# Patient Record
Sex: FEMALE
[De-identification: names, ages, dates, MRNs, and addresses within clinical notes are randomized per-mention and may not be internally consistent; named-entity substitution may affect disease eponyms.]

---

## 2019-01-24 LAB
HCT VFR BLD CALC: 40.4 % (ref 36–45)
LYMPHOCYTES # SPEC AUTO: 2.1 K/UL (ref 0.7–4.9)
PMV BLD: 10 FL (ref 7.6–11.3)
RBC # BLD: 4.45 M/UL (ref 3.86–4.86)

## 2019-01-24 NOTE — RAD REPORT
EXAM DESCRIPTION:  RAD - Chest Pa And Lat (2 Views) - 1/24/2019 11:00 am

 

CLINICAL HISTORY:  preop

Chest pain.

 

COMPARISON:  No comparisons

 

FINDINGS:  The lungs are clear. The heart is mildly enlarged in size. No displaced fractures. Cervica
l hardware plate is present.

## 2019-01-24 NOTE — EKG
Test Date:    2019-01-24               Test Time:    11:09:15

Technician:   FREIDA                                    

                                                     

MEASUREMENT RESULTS:                                       

Intervals:                                           

Rate:         60                                     

CO:           140                                    

QRSD:         84                                     

QT:           412                                    

QTc:          412                                    

Axis:                                                

P:            40                                     

CO:           140                                    

QRS:          31                                     

T:            20                                     

                                                     

INTERPRETIVE STATEMENTS:                                       

                                                     

Normal sinus rhythm

Normal ECG

No previous ECG available for comparison



Electronically Signed On 01-24-19 16:48:25 CST by Marcus Mack

## 2019-01-30 LAB
BUN BLD-MCNC: 13 MG/DL (ref 7–18)
GLUCOSE SERPLBLD-MCNC: 108 MG/DL (ref 74–106)
POTASSIUM SERPL-SCNC: 4.1 MMOL/L (ref 3.5–5.1)

## 2019-01-31 ENCOUNTER — HOSPITAL ENCOUNTER (OUTPATIENT)
Dept: HOSPITAL 97 - OR | Age: 69
Discharge: HOME | End: 2019-01-31
Attending: PODIATRIST
Payer: COMMERCIAL

## 2019-01-31 DIAGNOSIS — F17.210: ICD-10-CM

## 2019-01-31 DIAGNOSIS — M72.2: Primary | ICD-10-CM

## 2019-01-31 PROCEDURE — 71046 X-RAY EXAM CHEST 2 VIEWS: CPT

## 2019-01-31 PROCEDURE — 28008 INCISION OF FOOT FASCIA: CPT

## 2019-01-31 PROCEDURE — 80048 BASIC METABOLIC PNL TOTAL CA: CPT

## 2019-01-31 PROCEDURE — 0JNR0ZZ RELEASE LEFT FOOT SUBCUTANEOUS TISSUE AND FASCIA, OPEN APPROACH: ICD-10-PCS

## 2019-01-31 PROCEDURE — 36415 COLL VENOUS BLD VENIPUNCTURE: CPT

## 2019-01-31 PROCEDURE — 93005 ELECTROCARDIOGRAM TRACING: CPT

## 2019-01-31 PROCEDURE — 85025 COMPLETE CBC W/AUTO DIFF WBC: CPT

## 2019-01-31 NOTE — OP
Date of Procedure:  01/31/2019



Surgeon:  Bautista Pete DPM



Preoperative Diagnosis:  Plantar fasciitis, left foot.



Postoperative Diagnosis:  Plantar fasciitis, left foot.



Procedure:  Plantar fasciotomy, left foot.



Anesthesia:  Via local infiltration.



Procedure In Detail:  The patient was brought to the operating room, placed on the operating room tab
le in supine position.  Once adequate IV sedation was obtained, the patient was injected with a total
 of 10 cc of 0.5% Marcaine plain.  The patient was prepped and draped in the usual sterile manner.  T
he left extremity was elevated at 60 degrees and Esmarch bandage was applied to exsanguinate the bloo
d supply.  Pneumatic ankle tourniquet was elevated to 250 mmHg.  The Esmarch was removed.  Procedure 
attention was then directed to the plantar medial aspect of the left heel where a 2 cm plantar medial
 incision was made from the medial aspect of the plantar fascial border extending laterally 2 cm.  Th
e incision followed skin lines.  Utilizing sharp and blunt dissection, the plantar fascia was isolate
d.  All neurologic structures were avoided.  All bleeders were clamped and bovied.  The plantar fasci
a was visualized utilizing a 15 blade, an incision was made to free the plantar fascia from the left 
medial aspect to approximately 1/3 of the plantar fascia.  This was approximately 1 cm in length.  Th
e superior muscle belly was visualized without damage.  The area was flushed with copious amounts of 
sterile saline.  The skin was closed utilizing 3-0 nylon horizontal mattress suture.  The area was fl
ushed with copious amounts of sterile saline before closure.  The area was dressed with Adaptic, dry 
sterile gauze, dry sterile Kerlix, cold therapy pad and Ace bandage.  Pneumatic ankle tourniquet was 
deflated and capillary return was seen to be instantaneous to all 

digits.  The patient will be seen in my office for postoperative care.  The patient was sent to [facility] in satisfactory condition.





PRESLEY/ELBA

DD:  01/31/2019 08:20:37Voice ID:  [id number]

DT:  01/31/2019 19:00:09Report ID:  [id number]

## 2019-01-31 NOTE — PREOPHP
Date of Admission:  2019



PODIATRIC HISTORY AND PHYSICAL



History Of Present Illness:  This patient presented to my office with a chief complaint of painful le
ft heel, present with walking, throbbing in nature, relieved by rest.  The patient has been treated f
or plantar fasciitis in this foot with injection therapy and steroid, orthotics, changing shoe gear, 
stretching, ice, night splint, all with unsatisfactory relief of pain.  The patient requests surgical
 management.  The patient has been explained the alternatives of shockwave therapy, but declines.  Th
e patient has had a recent bout of shingles, which has resolved.



Current Medical History:  Includes as mentioned above, shingles and a history of cancer.



Past Surgical History:  Includes , gallbladder, shoulder repair, hysterectomy, __________, a
nd neck plate.



Current Medications:  Include omeprazole 40 mg, magnesium __________ and turmeric 400 mg.



Allergies:  THERE ARE NO TRUE ALLERGIES.  HOWEVER, THE PATIENT HAS SENSITIVITY TO CODEINE, HYDROCODON
E, AND TRAMADOL.



Social History:  The patient admits to smoking every day.  No alcohol or IV drug use.



Family History:  Includes cancer and reflux in her father.



Physical Examination:

General:  The patient is healthy, well developed, well nourished, well oriented x3. 

Vascular:  Evaluation reveals dorsalis pedis and posterior tibial pulses 4/4 bilaterally.  Capillary 
refill time is less than 3 seconds.  Temperature gradient is within normal limits.  There are no vari
cosities or history of DVT. 

Musculoskeletal:  The patient has a flexible cavus foot type bilaterally with increased varus of the 
rearfoot and supinatus of the forefoot with adductus bilaterally.  There is a dorsal medial eminence 
of the first metatarsal head with range of motion to 90 degrees bilaterally.  Equinus is noted to be 
0 degrees bilaterally per goniometer measurement.  Digital exam is within normal limits.  Muscle test
ing, knee assessment and ankle assessment are within normal limits equal and symmetrical bilaterally.
  There is tenderness on palpation of the plantar fascia on the left, but not right.  There are no paul
bcutaneous soft tissue growths noted on the feet bilaterally.  Skin evaluation reveals no rash, ulcer
, tumor or contracture.  There is some discrete callus noted on the plantar aspect of __________ MPJ 
in the left foot. 

Neurologic:  Evaluation reveals deep tendon reflexes of the patella and Achilles to be 5/5 bilaterall
y.  Vibratory and sharp dull sensation within normal limits. 



X-ray evaluation reveals an inferior calcaneal spur present with no fracture, tumor or DJD seen.  Les
ser metatarsals are adducted 1 to 4.



Diagnosis:  Plantar fasciitis, left foot, with calcaneal spur, left foot.



Plan:  Recommended treatment is for plantar fasciotomy of the left foot.  The patient understands ris
ks, benefits, and alternatives of the above-mentioned procedure including, but not limited to the ris
k of pain, swelling, numbness, stiffness, nonhealing of skin and recurrence of the problem.  Risks of
 DVT and PE have been explained to the patient.  Due to lack of success of conservative treatment, no
 response to conservative treatment, the patient requests surgical management.  Cold therapy unit has
 been explained to the patient.  The patient will be instructed to take two 500 mg tablets of Tylenol
 and 3 ibuprofen every 6 hours as needed for pain and may lower it to Tylenol and 2 ibuprofen with he
r omeprazole.  The patient may also take her Lyrica that she still has for additional pain relief, bu
t not more than twice daily on the Lyrica.  The patient is scheduled for surgery at [facility] on 2019.  Medical H and P will be completed by Anesthesia.  Preoperative labs
 have been ordered.





KISHORE

DD:  2019 10:12:04Voice ID:  [id number]

## 2019-01-31 NOTE — DS
Date of Discharge:  01/31/2019



Date Of Surgery:  01/31/2019.



Surgeon:  Bautista Pete DPM.



Preoperative Diagnosis:  Plantar fasciitis, left foot.



Postoperative Diagnosis:  Plantar fasciitis, left foot.



Procedure:  Plantar fasciotomy left foot.



Anesthesia:  The patient tolerated the anesthesia and procedure well. 



The patient was transferred to recovery room in satisfactory condition and may be sent home per [facility] guidelines. The patient may ambulate in postop shoes.  The patient has been give postop written
 instructions in written form as well as emergency phone numbers and instructions on how to use cold 
therapy unit and take her Lyrica and ibuprofen as tolerated due to her multiple allergies and narcoti
cs.





PRESLEY/ELBA

DD:  01/31/2019 08:20:37Voice ID:  [id number]

DT:  01/31/2019 19:07:37Report ID:  [id number]

## 2023-02-17 LAB
BUN BLD-MCNC: 13 MG/DL (ref 7–18)
GLUCOSE SERPLBLD-MCNC: 100 MG/DL (ref 74–106)
HCT VFR BLD CALC: 38.7 % (ref 36–45)
INR BLD: 1
LYMPHOCYTES # SPEC AUTO: 2 K/UL (ref 0.7–4.9)
MCV RBC: 89.8 FL (ref 80–100)
PMV BLD: 8.9 FL (ref 7.6–11.3)
POTASSIUM SERPL-SCNC: 4.2 MMOL/L (ref 3.5–5.1)
RBC # BLD: 4.3 M/UL (ref 3.86–4.86)

## 2023-02-17 NOTE — EKG
Test Date:    2023-02-17               Test Time:    10:10:25

Technician:   LUCÍA                                     

                                                     

MEASUREMENT RESULTS:                                       

Intervals:                                           

Rate:         61                                     

CT:           146                                    

QRSD:         80                                     

QT:           390                                    

QTc:          392                                    

Axis:                                                

P:            65                                     

CT:           146                                    

QRS:          64                                     

T:            63                                     

                                                     

INTERPRETIVE STATEMENTS:                                       

                                                     

Normal sinus rhythm

Normal ECG

Compared to ECG 01/24/2019 11:09:15

No significant changes



Electronically Signed On 02-17-23 15:59:46 CST by Ari Man

## 2023-02-17 NOTE — RAD REPORT
EXAM DESCRIPTION:  RAD - Chest Pa And Lat (2 Views) - 2/17/2023 10:31 am

 

CLINICAL HISTORY:  pre op for surgery

 

COMPARISON:  Chest Pa And Lat (2 Views) dated 1/24/2019

 

FINDINGS:  Lines: None.

Lungs: No evidence of edema or pneumonia.

Pleural: No significant pleural effusions or pneumothorax.

Cardiac: The heart size is within normal limits.

Mediastinum: Within normal limits.

Bones: No acute fractures. ACDF in the cervical spine.

Other: None

 

IMPRESSION:  No acute cardiopulmonary disease.

## 2023-02-22 ENCOUNTER — HOSPITAL ENCOUNTER (OUTPATIENT)
Dept: HOSPITAL 97 - OR | Age: 73
Setting detail: OBSERVATION
LOS: 1 days | Discharge: HOME HEALTH | End: 2023-02-23
Attending: ORTHOPAEDIC SURGERY | Admitting: ORTHOPAEDIC SURGERY
Payer: COMMERCIAL

## 2023-02-22 VITALS — BODY MASS INDEX: 32.5 KG/M2

## 2023-02-22 VITALS — OXYGEN SATURATION: 91 %

## 2023-02-22 DIAGNOSIS — Z20.822: ICD-10-CM

## 2023-02-22 DIAGNOSIS — M17.11: Primary | ICD-10-CM

## 2023-02-22 DIAGNOSIS — Z88.6: ICD-10-CM

## 2023-02-22 DIAGNOSIS — I10: ICD-10-CM

## 2023-02-22 LAB — HCT VFR BLD CALC: 34.3 % (ref 36–45)

## 2023-02-22 PROCEDURE — 85730 THROMBOPLASTIN TIME PARTIAL: CPT

## 2023-02-22 PROCEDURE — 71046 X-RAY EXAM CHEST 2 VIEWS: CPT

## 2023-02-22 PROCEDURE — 36415 COLL VENOUS BLD VENIPUNCTURE: CPT

## 2023-02-22 PROCEDURE — 85610 PROTHROMBIN TIME: CPT

## 2023-02-22 PROCEDURE — 73560 X-RAY EXAM OF KNEE 1 OR 2: CPT

## 2023-02-22 PROCEDURE — 88304 TISSUE EXAM BY PATHOLOGIST: CPT

## 2023-02-22 PROCEDURE — 97116 GAIT TRAINING THERAPY: CPT

## 2023-02-22 PROCEDURE — 85018 HEMOGLOBIN: CPT

## 2023-02-22 PROCEDURE — 97139 UNLISTED THERAPEUTIC PX: CPT

## 2023-02-22 PROCEDURE — 93005 ELECTROCARDIOGRAM TRACING: CPT

## 2023-02-22 PROCEDURE — 97161 PT EVAL LOW COMPLEX 20 MIN: CPT

## 2023-02-22 PROCEDURE — 27447 TOTAL KNEE ARTHROPLASTY: CPT

## 2023-02-22 PROCEDURE — 85025 COMPLETE CBC W/AUTO DIFF WBC: CPT

## 2023-02-22 PROCEDURE — 94010 BREATHING CAPACITY TEST: CPT

## 2023-02-22 PROCEDURE — 88311 DECALCIFY TISSUE: CPT

## 2023-02-22 PROCEDURE — 87811 SARS-COV-2 COVID19 W/OPTIC: CPT

## 2023-02-22 PROCEDURE — 97110 THERAPEUTIC EXERCISES: CPT

## 2023-02-22 PROCEDURE — 97530 THERAPEUTIC ACTIVITIES: CPT

## 2023-02-22 PROCEDURE — 85014 HEMATOCRIT: CPT

## 2023-02-22 PROCEDURE — 0SRC069 REPLACEMENT OF RIGHT KNEE JOINT WITH OXIDIZED ZIRCONIUM ON POLYETHYLENE SYNTHETIC SUBSTITUTE, CEMENTED, OPEN APPROACH: ICD-10-PCS

## 2023-02-22 PROCEDURE — 80048 BASIC METABOLIC PNL TOTAL CA: CPT

## 2023-02-22 RX ADMIN — HYDROCODONE BITARTRATE AND ACETAMINOPHEN PRN TAB: 7.5; 325 TABLET ORAL at 16:40

## 2023-02-22 RX ADMIN — SODIUM CHLORIDE SCH MLS: 9 INJECTION, SOLUTION INTRAVENOUS at 16:47

## 2023-02-22 RX ADMIN — ONDANSETRON PRN MG: 2 INJECTION INTRAMUSCULAR; INTRAVENOUS at 17:58

## 2023-02-22 RX ADMIN — HYDROCODONE BITARTRATE AND ACETAMINOPHEN PRN TAB: 7.5; 325 TABLET ORAL at 21:02

## 2023-02-22 NOTE — P.BOP
Preoperative diagnosis: right knee osteoarthritis


Postoperative diagnosis: same


Primary procedure: right total knee arthroplasty


Assistant: NONE,NONE


Estimated blood loss: 40 cc


Specimen: right knee bone remnants


Findings: see dictation


Anesthesia: General


Complications: None


Implants: Bi[employer] Jules Persona 5 CR femur, D tibia, 10 CR poly, 26 patella


Fluids & blood products: per anesthesia record; TT: 64 mins @ 300 mmHg


Transferred to: Recovery Room


Condition: Good

## 2023-02-22 NOTE — RAD REPORT
EXAM DESCRIPTION:  RAD - Knee Right 2 View - 2/22/2023 3:49 pm

 

CLINICAL HISTORY:  Postop

 

COMPARISON:  None.

 

FINDINGS:  Two views of the right knee.

 

A right total knee arthroplasty has been performed. Hardware is in satisfactory alignment, without ev
idence of immediate complications. Soft tissue gas and Skin staples are noted. No suspicious osseous 
lesions.

 

IMPRESSION:  Postoperative right knee following right total-knee arthroplasty with no unexpected find
ing.

## 2023-02-22 NOTE — XMS REPORT
Continuity of Care Document

                          Created on:2023



Patient:NIGEL LEZAMA

Sex:Female

:1950

External Reference #:[id number]





Demographics







                          Address                   [address] [place] [address]

 

                          Home Phone                [phone]

 

                          Work Phone                [phone]

 

                          Mobile Phone              [phone]

 

                          Email Address             [email or web address]

 

                          Preferred Language        English

 

                          Marital Status            Unknown

 

                          Episcopalian Affiliation     Unknown

 

                          Race                      Unknown

 

                          Additional Race(s)        Unavailable

 

                          Ethnic Group              Unknown









Author







                          Organization              [facility]

t

 

                          Address                   [address]



                                                    [place] [address]

 

                          Phone                     [phone]









Support







                Name            Relationship    Address         Phone

 

                LORENZA LEZAMAU            [address] [phone]



                                                [place] [address] 

 

                LORENZA LEZAMA SPOU            [address] [phone]



                                                [place] [address] 

 

                Nigel Lezama Unavailable     [phone]



                                                [place] [address] 

 

                Lorenza Lezama Unavailable     [phone]



                                                [place] [address] 

 

                YEAGER III, АЛЕКСАНДР Unavailable     Unavailable     [phone]









Care Team Providers







                    Name                Role                Phone

 

                    Candido Llanes     Attending Clinician Unavailable

 

                    Luis Alberto Sinclair       Attending Clinician Unavailable

 

                    Luis Alberto Sinclair       Admitting Clinician Unavailable









Payers







           Payer Name Policy Type Policy Number Effective Date Expiration Date S

shikha

 

           [employer]ET[employer]      53         [id number] 2022            Common Spiri

t



                                            00:00:00              - [facility]

 

           [employer]ETNA      53         [id number]                       Common Spiri

t



                                                                  - [facility]

 

           AETNA MEDICARE 53         [id number]                         Common Spi

rit



                                                                  - [facility]







Problems







       Condition Condition Condition Status Onset  Resolution Last   Treating Co

mments 

Source



       Name   Details Category        Date   Date   Treatment Clinician        



                                                 Date                 

 

       [id number] Basal cell Problem                                           Co

mmon



              carcinoma                                                  [facility]



              of skin of                                                  - [facility]



              nose                                                    [facility]

 

       [id number] Arthritis Problem                                           Co

mmon



       [id number] of right                                                  [facility]



              knee                                                    - [facility]



                                                                      [facility]

 

       [id number] Essential Problem                                           Comm

on



              (primary)                                                  Spirit



              hypertensi                                                  - [facility]



              on                                                      [facility]

 

       Gastro-eso Gastro-eso Problem                                           C

ommon



       phageal phageal                                                  Spirit



       reflux reflux                                                  - [facility]



       disease disease                                                  [facility]



       without without                                                  [facility]



       esophagiti esophagiti                                                  Me

dical



       s      s                                                       Center

 

       Osteopenia Osteopenia Problem                                           C

ommon



                                                                      Spirit



                                                                      - [facility]



                                                                      [facility]

 

       Arthralgia Hip pain, Problem                                           Co

mmon



       of the right                                                   [facility]



       pelvic                                                         - [facility]



       region and                                                         [facility]

 

       [id number] Acute  Problem                                           Common



              vaginitis                                                  [facility]

 

       Migraine Migraine Problem                                           Commo

n



       aura   headache                                                  Spirit



       without without                                                  - [facility]



       headache aura                                                    [facility]

 

       [id number] Hypercalce Problem                                           Com

mon



              christiano                                                     Spirit



                                                                      [facility]

 

       Osteoarthr Osteoarthr Problem                                           C

ommon



       itis of itis of                                                  [facility]



       knee   knee,                                                   - [facility]



              unspecifie                                                  [facility]



              laterality                                                  Medica

l



              ,                                                       Center



              unspecifie                                                  



              d                                                       



              osteoarthr                                                  



              itis type                                                  

 

       [id number] Post   Problem                                           Common



              herpetic                                                  Spirit



              neuralgia                                                  - [facility]



                                                                      [facility]

 

       [id number] Body mass Problem                                           Com

mon



              index                                                   Spirit



              (BMI) of                                                  - [facility]



              32.0-32.9                                                  St



              in [facility]

 

       [id number] Other  Problem                                           Common



              obesity                                                  Spirit



              due to                                                  - [facility]



              excess                                                  [facility]

 

       [id number] Mixed  Problem                                           Common



              hyperlipid                                                  Spirit



              emia                                                    [facility]

 

       [id number] Primary Problem                                           Comm

on



       73781  osteoarthr                                                  Spirit



              itis of                                                  - [facility]



              left knee                                                  [facility]







Allergies, Adverse Reactions, Alerts







       Allergy Allergy Status Severity Reaction(s) Onset  Inactive Treating Comm

ents 

Source



       Name   Type                        Date   Date   Clinician        

 

       VICODIN DA     Active U             2008                      [facility]



                                          0-14                        Clear



                                          00:00:                      [facility]



                                                                    [facility]

 

       CODEINE DA     Active U             2008                      [facility]



                                          0-14                        Clear



                                          00:00:                      [facility]



                                                                    [facility]

 

       MORPHINE DA     Active U             2008                      [facility]



                                          0-14                        Clear



                                          00:00:                      [facility]



                                                                    [facility]

 

       No Known DA     Active U             2008                      [facility]



       Contrast                             0-14                        Clear



       Allergie                             00:00:                      Johnson



       s                                                            [facility]

 

       No Known DA     Active U             2008                      [facility]



       Food                               0-14                        Clear



       Allergie                             00:00:                      Johnson



       s                                                            [facility]

 

       No Known DA     Active U             2008                      [facility]



       Other                              0-14                        Clear



       Allergie                             00:00:                      Johnson



       s                                                            [facility]

 

       codeine DA     Active U             -                      [facility]



                                          2-03                        Clear



                                          00:00:                      [facility]



                                                                    [facility]

 

       Codeine Codeine Active        Unknown                             Common



                                                                      [facility]







Social History







           Social Habit Start Date Stop Date  Quantity   Comments   Source

 

           History of Tobacco Use                                             Co

mmon [facility]

 

           Sex Assigned At Birth                                             Com

mon [facility]









                Smoking Status  Start Date      Stop Date       Source

 

                Never Smoker                                    Common [facility]







Medications







       Ordered Filled Start  Stop   Current Ordering Indication Dosage Frequency

 Signature

                    Comments            Components          Source



     Medication Medication Date Date Medication? Clinician                (SIG) 

          



     Name Name                                                   

 

     Lin Vancemaciej 2022      No             1{appli BID  Triamcinol 

          



     ne   ne   2-27                     cation}      one            



     Acetonide Acetonide 00:00:                               Acetonide         

  



     0.1 % 0.1 % 00                                 0.1 %           

 

     Triamcinolo Triamcinolo 2022      No             1{appli BID  Triamcinol 

          



     ne   ne   2-27                     cation}      one            



     Acetonide Acetonide 00:00:                               Acetonide         

  



     0.1 % 0.1 % 00                                 0.1 %           

 

     Triamcinolo Triamcinolo 2022      No             1{appli BID  Triamcinol 

          



     ne   ne   2-27                     cation}      one            



     Acetonide Acetonide 00:00:                               Acetonide         

  



     0.1 % 0.1 % 00                                 0.1 %           

 

     Triamcinolo Triamcinolo 2022      No             1{appli BID  Triamcinol 

          



     ne   ne   2-27                     cation}      one            



     Acetonide Acetonide 00:00:                               Acetonide         

  



     0.1 % 0.1 % 00                                 0.1 %           

 

     Triamcinolo Triamcinolo 2022      No             1{appli BID  Triamcinol 

          



     ne   ne   2-27                     cation}      one            



     Acetonide Acetonide 00:00:                               Acetonide         

  



     0.1 % 0.1 % 00                                 0.1 %           

 

     Triamcinolo Triamcinolo 2022      No             1{appli BID  Triamcinol 

          



     ne   ne   2-27                     cation}      one            



     Acetonide Acetonide 00:00:                               Acetonide         

  



     0.1 % 0.1 % 00                                 0.1 %           

 

     Ketorolac Ketorolac -0      No             30mg                     Com

mon



     15mg 15mg 7-15                                              Spirit



               00:00:                                              -                                                 [facility]

 

     Ketorolac Ketorolac -0      No             30mg                     Com

mon



     15mg 15mg 7-15                                              Spirit



               00:00:                                              -                                                 [facility]

 

     Ketorolac Ketorolac -0      No             30mg                     Com

mon



     15mg 15mg 7-15                                              Spirit



               00:00:                                                                                              [facility]

 

     Ketorolac Ketorolac -0      No             30mg                     Com

mon



     15mg 15mg 7-15                                              Spirit



               00:00:                                              - [facility]



                                                               [facility]

 

     Ketorolac Ketorolac 2-0      No             30mg                     Com

mon



     15mg 15mg 7-15                                              Spirit



               00:00:                                              - [facility]



                                                               [facility]

 

     Ketorolac Ketorolac 2-0      No             30mg                     Com

mon



     15mg 15mg 7-15                                              Spirit



               00:00:                                              - [facility]



                                                               [facility]

 

     Ketorolac Ketorolac 2-0      No             30mg                     Com

mon



     15mg 15mg 7-15                                              Spirit



               00:00:                                              - [facility]



                                                               [facility]

 

     Ketorolac Ketorolac 2-0      No             30mg                     Com

mon



     15mg 15mg 7-15                                              Spirit



               00:00:                                              - [facility]



                                                               [facility]

 

     Ketorolac Ketorolac 2-0      No             30mg                     Com

mon



     15mg 15mg 7-15                                              Spirit



               00:00:                                              - [facility]I



                                                               [facility]

 

     Ketorolac Ketorolac 2-0      No             30mg                     Com

mon



     15mg 15mg 7-15                                              Spirit



               00:00:                                              - [facility]



                                                               [facility]

 

     Ketorolac Ketorolac 2-0      No             30mg                     Com

mon



     15mg 15mg 7-15                                              Spirit



               00:00:                                              - [facility]



                                                               [facility]

 

     Pseudoeph-B Pseudoeph-B 2022- No             10{ml_a TID  Pseudoeph-

           



     romphen-DM romphen-DM                 s_neede      Bromphen-D    

       



     20 00:00: 00:00                d}        M 30-1-20           



     MG/5ML MG/5ML 00   :00                           MG/5ML           

 

     Pseudoeph-B Pseudoeph-B 2022- No             10{ml_a TID  Pseudoeph-

           



     romphen-DM romphen-DM                 s_neede      Bromphen-D    

       



     20 00:00: 00:00                d}        M 30-1-20           



     MG/5ML MG/5ML 00   :00                           MG/5ML           

 

     Pseudoeph-B Pseudoeph-B 2022- No             10{ml_a TID  Pseudoeph-

           



     romphen-DM romphen-DM                 s_neede      Bromphen-D    

       



     20 00:00: 00:00                d}        M 30-1-20           



     MG/5ML MG/5ML 00   :00                           MG/5ML           

 

     Pseudoeph-B Pseudoeph-B -0 2- No             10{ml_a TID  Pseudoeph-

           



     romphen-DM romphen-DM  05-26                s_neede      Bromphen-D    

       



     30-20 - 00:00: 00:00                d}        M 30-1-20           



     MG/5ML MG/5ML 00   :00                           MG/5ML           

 

     Bupivicaine Bupivicaine -0      No             2.5mg                   

  Common



     Hydro Hydro 1-13                                              Spirit



               00:00:                                              - [facility]



               00                                                [facility]

 

     Bupivicaine Bupivicaine -0      No             2.5mg                   

  Common



     Hydro Hydro 1-13                                              Spirit



               00:00:                                              - [facility]



               00                                                [facility]

 

     Kenalog Kenalog -0      No             40mg                     Common



     (Triamcinol (Triamcinol 1-13                                              S

pirit



     one) one) 00:00:                                              - [facility]I



               00                                                [facility]

 

     Kenalog Kenalog -0      No             40mg                     Common



     (Triamcinol (Triamcinol 1-13                                              S

pirit



     one) one) 00:00:                                              - [facility]I



               00                                                [facility]

 

     Bupivicaine Bupivicaine 2-0      No             2.5mg                   

  Common



     Hydro Hydro 1-13                                              Spirit



               00:00:                                              - [facility]I



               00                                                [facility]

 

     Bupivicaine Bupivicaine 2-0      No             2.5mg                   

  Common



     Hydro Hydro 1-13                                              Spirit



               00:00:                                              - [facility]



               00                                                [facility]

 

     Kenalog Kenalog -0      No             40mg                     Common



     (Triamcinol (Triamcinol 1-13                                              S

pirit



     one) one) 00:00:                                              - [facility]



               00                                                [facility]

 

     Kenalog Kenalog -0      No             40mg                     Common



     (Triamcinol (Triamcinol 1-13                                              S

pirit



     one) one) 00:00:                                              - [facility]I



               00                                                [facility]

 

     Bupivicaine Bupivicaine 2-0      No             2.5mg                   

  Common



     Hydro Hydro 1-13                                              Spirit



               00:00:                                              - [facility]



               00                                                [facility]

 

     Bupivicaine Bupivicaine 2-0      No             2.5mg                   

  Common



     Hydro Hydro 1-13                                              Spirit



               00:00:                                              - [facility]



               00                                                [facility]

 

     Kenalog Kenalog -0      No             40mg                     Common



     (Triamcinol (Triamcinol 1-13                                              S

pirit



     one) one) 00:00:                                              - [facility]



               00                                                [facility]

 

     Kenalog Kenalog -0      No             40mg                     Common



     (Triamcinol (Triamcinol 1-13                                              S

pirit



     one) one) 00:00:                                              - [facility]



               00                                                [facility]

 

     Bupivicaine Bupivicaine -0      No             2.5mg                   

  Common



     Hydro Hydro 1-13                                              Spirit



               00:00:                                              - [facility]



               00                                                [facility]

 

     Bupivicaine Bupivicaine -0      No             2.5mg                   

  Common



     Hydro Hydro 1-13                                              Spirit



               00:00:                                              - [facility]



               00                                                [facility]

 

     Kenalog Kenalog -0      No             40mg                     Common



     (Triamcinol (Triamcinol 1-13                                              S

pirit



     one) one) 00:00:                                              - [facility]



               00                                                [facility]

 

     Kenalog Kenalog -0      No             40mg                     Common



     (Triamcinol (Triamcinol 1-13                                              S

pirit



     one) one) 00:00:                                              - [facility]



               00                                                [facility]

 

     Bupivicaine Bupivicaine -0      No             2.5mg                   

  Common



     Hydro Hydro 1-13                                              Spirit



               00:00:                                              - [facility]



               00                                                [facility]

 

     Bupivicaine Bupivicaine -0      No             2.5mg                   

  Common



     Hydro Hydro 1-13                                              Spirit



               00:00:                                              - [facility]



               00                                                [facility]

 

     Kenalog Kenalog -0      No             40mg                     Common



     (Triamcinol (Triamcinol 1-13                                              S

pirit



     one) one) 00:00:                                              - [facility]



               00                                                [facility]

 

     Kenalog Kenalog -0      No             40mg                     Common



     (Triamcinol (Triamcinol 1-13                                              S

pirit



     one) one) 00:00:                                              - [facility]



               00                                                [facility]

 

     Bupivicaine Bupivicaine 2-0      No             2.5mg                   

  Common



     Hydro Hydro 1-13                                              Spirit



               00:00:                                              - [facility]



               00                                                [facility]

 

     Bupivicaine Bupivicaine 2-0      No             2.5mg                   

  Common



     Hydro Hydro 1-13                                              Spirit



               00:00:                                              - [facility]



               00                                                [facility]

 

     Kenalog Kenalog 0      No             40mg                     Common



     (Triamcinol (Triamcinol 1-13                                              S

pirit



     one) one) 00:00:                                              - [facility]



               00                                                [facility]

 

     Kenalog Kenalog -0      No             40mg                     Common



     (Triamcinol (Triamcinol 1-13                                              S

pirit



     one) one) 00:00:                                              - [facility]



               00                                                [facility]

 

     Bupivicaine Bupivicaine -0      No             2.5mg                   

  Common



     Hydro Hydro 1-13                                              Spirit



               00:00:                                              - [facility]



               00                                                [facility]

 

     Bupivicaine Bupivicaine -0      No             2.5mg                   

  Common



     Hydro Hydro 1-13                                              Spirit



               00:00:                                              - [facility]



               00                                                [facility]

 

     Kenalog Kenalog -0      No             40mg                     Common



     (Triamcinol (Triamcinol 1-13                                              S

pirit



     one) one) 00:00:                                              - [facility]



               00                                                [facility]

 

     Kenalog Kenalog -0      No             40mg                     Common



     (Triamcinol (Triamcinol 1-13                                              S

pirit



     one) one) 00:00:                                              - [facility]



               00                                                [facility]

 

     Bupivicaine Bupivicaine -0      No             2.5mg                   

  Common



     Hydro Hydro 1-13                                              Spirit



               00:00:                                              - [facility]



               00                                                [facility]

 

     Bupivicaine Bupivicaine -0      No             2.5mg                   

  Common



     Hydro Hydro 1-13                                              Spirit



               00:00:                                              - [facility]



               00                                                [facility]

 

     Kenalog Kenalog -0      No             40mg                     Common



     (Triamcinol (Triamcinol 1-13                                              S

pirit



     one) one) 00:00:                                              - [facility]



               00                                                [facility]

 

     Kenalog Kenalog -0      No             40mg                     Common



     (Triamcinol (Triamcinol 1-13                                              S

pirit



     one) one) 00:00:                                              - [facility]



               00                                                [facility]

 

     Bupivicaine Bupivicaine -0      No             2.5mg                   

  Common



     Hydro Hydro 1-13                                              Spirit



               00:00:                                              - [facility]



               00                                                [facility]

 

     Bupivicaine Bupivicaine -0      No             2.5mg                   

  Common



     Hydro Hydro 1-13                                              Spirit



               00:00:                                              - [facility]



               00                                                [facility]

 

     Kenalog Kenalog -0      No             40mg                     Common



     (Triamcinol (Triamcinol 1-13                                              S

pirit



     one) one) 00:00:                                              - [facility]



               00                                                [facility]

 

     Kenalog Kenalog -0      No             40mg                     Common



     (Triamcinol (Triamcinol 1-13                                              S

pirit



     one) one) 00:00:                                              - [facility]



               00                                                [facility]

 

     Bupivicaine Bupivicaine -0      No             2.5mg                   

  Common



     Hydro Hydro 1-13                                              Spirit



               00:00:                                              - [facility]



               00                                                [facility]

 

     Bupivicaine Bupivicaine -0      No             2.5mg                   

  Common



     Hydro Hydro 1-13                                              Spirit



               00:00:                                              - [facility]



               00                                                [facility]

 

     Kenalog Kenalog -0      No             40mg                     Common



     (Triamcinol (Triamcinol 1-13                                              S

pirit



     one) one) 00:00:                                              - [facility]



               00                                                [facility]

 

     Kenalog Kenalog -0      No             40mg                     Common



     (Triamcinol (Triamcinol 1-13                                              S

pirit



     one) one) 00:00:                                              - [facility]



               00                                                [facility]

 

     Bupivicaine Bupivicaine -0      No             2.5mg                   

  Common



     Hydro Hydro 1-13                                              Spirit



               00:00:                                              - [facility]



               00                                                [facility]

 

     Bupivicaine Bupivicaine -0      No             2.5mg                   

  Common



     Hydro Hydro 1-13                                              Spirit



               00:00:                                              - [facility]



               00                                                [facility]

 

     Kenalog Kenalog -0      No             40mg                     Common



     (Triamcinol (Triamcinol 1-13                                              S

pirit



     one) one) 00:00:                                              - [facility]



               00                                                [facility]

 

     Kenalog Kenalog -0      No             40mg                     Common



     (Triamcinol (Triamcinol 1-13                                              S

pirit



     one) one) 00:00:                                              - [facility]



               00                                                [facility]

 

     Bupivicaine Bupivicaine -0      No             2.5mg                   

  Common



     Hydro Hydro 1-13                                              Spirit



               00:00:                                              - [facility]



               00                                                [facility]

 

     Bupivicaine Bupivicaine -0      No             2.5mg                   

  Common



     Hydro Hydro 1-13                                              Spirit



               00:00:                                              - [facility]



               00                                                [facility]

 

     Kenalog Kenalog -0      No             40mg                     Common



     (Triamcinol (Triamcinol 1-13                                              S

pirit



     one) one) 00:00:                                              - [facility]



               00                                                [facility]

 

     Kenalog Kenalog 0      No             40mg                     Common



     (Triamcinol (Triamcinol 1-13                                              S

pirit



     one) one) 00:00:                                              - [facility]



               00                                                [facility]

 

     Bupivicaine Bupivicaine -0      No             2.5mg                   

  Common



     Hydro Hydro 1-13                                              Spirit



               00:00:                                              - [facility]



               00                                                [facility]

 

     Bupivicaine Bupivicaine -0      No             2.5mg                   

  Common



     Hydro Hydro 1-13                                              Spirit



               00:00:                                              - [facility]



               00                                                [facility]

 

     Kenalog Kenalog -0      No             40mg                     Common



     (Triamcinol (Triamcinol 1-13                                              S

pirit



     one) one) 00:00:                                              - [facility]



               00                                                [facility]

 

     Kenalog Kenalog -0      No             40mg                     Common



     (Triamcinol (Triamcinol 1-13                                              S

pirit



     one) one) 00:00:                                              - [facility]



               00                                                [facility]

 

     Bupivicaine Bupivicaine -0      No             2.5mg                   

  Common



     Hydro Hydro 1-13                                              Spirit



               00:00:                                              - [facility]



               00                                                [facility]

 

     Bupivicaine Bupivicaine -0      No             2.5mg                   

  Common



     Hydro Hydro 1-13                                              Spirit



               00:00:                                              - [facility]



               00                                                [facility]

 

     Kenalog Kenalog -0      No             40mg                     Common



     (Triamcinol (Triamcinol 1-13                                              S

pirit



     one) one) 00:00:                                              - [facility]



               00                                                [facility]

 

     Kenalog Kenalog -0      No             40mg                     Common



     (Triamcinol (Triamcinol 1-13                                              S

pirit



     one) one) 00:00:                                              - [facility]



               00                                                [facility]

 

     Bupivicaine Bupivicaine -0      No             2.5mg                   

  Common



     Hydro Hydro 1-13                                              Spirit



               00:00:                                              - [facility]



               00                                                [facility]

 

     Bupivicaine Bupivicaine -0      No             2.5mg                   

  Common



     Hydro Hydro 1-13                                              Spirit



               00:00:                                              - [facility]



               00                                                [facility]

 

     Kenalog Kenalog -0      No             40mg                     Common



     (Triamcinol (Triamcinol 1-13                                              S

pirit



     one) one) 00:00:                                              - [facility]



               00                                                [facility]

 

     Kenalog Kenalog -0      No             40mg                     Common



     (Triamcinol (Triamcinol 1-13                                              S

pirit



     one) one) 00:00:                                              - [facility]



               00                                                [facility]

 

     Bupivicaine Bupivicaine -0      No             2.5mg                   

  Common



     Hydro Hydro 1-13                                              Spirit



               00:00:                                              - [facility]



               00                                                [facility]

 

     Bupivicaine Bupivicaine -0      No             2.5mg                   

  Common



     Hydro Hydro 1-13                                              Spirit



               00:00:                                              - [facility]



               00                                                [facility]

 

     Kenalog Kenalog -0      No             40mg                     Common



     (Triamcinol (Triamcinol 1-13                                              S

pirit



     one) one) 00:00:                                              - [facility]



               00                                                [facility]

 

     Kenalog Kenalog -0      No             40mg                     Common



     (Triamcinol (Triamcinol 1-13                                              S

pirit



     one) one) 00:00:                                              - [facility]



               00                                                [facility]

 

     Bupivicaine Bupivicaine -0      No             2.5mg                   

  Common



     Hydro Hydro 1-13                                              Spirit



               00:00:                                              - [facility]



                                                               [facility]

 

     Bupivicaine Bupivicaine -0      No             2.5mg                   

  Common



     Hydro Hydro 1-13                                              Spirit



               00:00:                                              - [facility]



               00                                                [facility]

 

     Kenalog Kenalog -0      No             40mg                     Common



     (Triamcinol (Triamcinol 1-13                                              S

pirit



     one) one) 00:00:                                              - [facility]



               00                                                [facility]

 

     Kenalog Kenalog -0      No             40mg                     Common



     (Triamcinol (Triamcinol 1-13                                              S

pirit



     one) one) 00:00:                                              - [facility]



               00                                                [facility]

 

     Hydrocortis Hydrocortis 2021      No             3{drops TID  Hydrocorti 

          



     one-Acetic one-Acetic 2-09                     _into_a      sone-Aceti     

      



     Acid 1-2 % Acid 1-2 % 00:00:                     ffected      c Acid 1-2   

        



               00                       _ear}      %              

 

     Hydrocortis Hydrocortis 2021      No             3{drops TID  Hydrocorti 

          



     one-Acetic one-Acetic 2-09                     _into_a      sone-Aceti     

      



     Acid 1-2 % Acid 1-2 % 00:00:                     ffected      c Acid 1-2   

        



               00                       _ear}      %              

 

     Hydrocortis Hydrocortis 2021      No             3{drops TID  Hydrocorti 

          



     one-Acetic one-Acetic 2-09                     _into_a      sone-Aceti     

      



     Acid 1-2 % Acid 1-2 % 00:00:                     ffected      c Acid 1-2   

        



               00                       _ear}      %              

 

     Hydrocortis Hydrocortis 2021      No             3{drops TID  Hydrocorti 

          



     one-Acetic one-Acetic 2-09                     _into_a      sone-Aceti     

      



     Acid 1-2 % Acid 1-2 % 00:00:                     ffected      c Acid 1-2   

        



               00                       _ear}      %              

 

     Hydrocortis Hydrocortis 2021      No             3{drops TID  Hydrocorti 

          



     one-Acetic one-Acetic 2-09                     _into_a      sone-Aceti     

      



     Acid 1-2 % Acid 1-2 % 00:00:                     ffected      c Acid 1-2   

        



               00                       _ear}      %              

 

     Hydrocortis Hydrocortis 2021      No             3{drops TID  Hydrocorti 

          



     one-Acetic one-Acetic 2-09                     _into_a      sone-Aceti     

      



     Acid 1-2 % Acid 1-2 % 00:00:                     ffected      c Acid 1-2   

        



               00                       _ear}      %              

 

     Hydrocortis Hydrocortis 2021      No             3{drops TID  Hydrocorti 

          



     one-Acetic one-Acetic 2-09                     _into_a      sone-Aceti     

      



     Acid 1-2 % Acid 1-2 % 00:00:                     ffected      c Acid 1-2   

        



               00                       _ear}      %              

 

     Hydrocortis Hydrocortis 2021      No             3{drops TID  Hydrocorti 

          



     one-Acetic one-Acetic 2-09                     _into_a      sone-Aceti     

      



     Acid 1-2 % Acid 1-2 % 00:00:                     ffected      c Acid 1-2   

        



               00                       _ear}      %              

 

     Hydrocortis Hydrocortis 2021      No             3{drops TID  Hydrocorti 

          



     one-Acetic one-Acetic 2-09                     _into_a      sone-Aceti     

      



     Acid 1-2 % Acid 1-2 % 00:00:                     ffected      c Acid 1-2   

        



               00                       _ear}      %              

 

     Hydrocortis Hydrocortis 2021      No             3{drops TID  Hydrocorti 

          



     one-Acetic one-Acetic 2-09                     _into_a      sone-Aceti     

      



     Acid 1-2 % Acid 1-2 % 00:00:                     ffected      c Acid 1-2   

        



               00                       _ear}      %              

 

     Hydrocortis Hydrocortis 2021      No             3{drops TID  Hydrocorti 

          



     one-Acetic one-Acetic 2-09                     _into_a      sone-Aceti     

      



     Acid 1-2 % Acid 1-2 % 00:00:                     ffected      c Acid 1-2   

        



               00                       _ear}      %              

 

     Hydrocortis Hydrocortis 2021      No             3{drops TID  Hydrocorti 

          



     one-Acetic one-Acetic 2-09                     _into_a      sone-Aceti     

      



     Acid 1-2 % Acid 1-2 % 00:00:                     ffected      c Acid 1-2   

        



               00                       _ear}      %              

 

     Hydrocortis Hydrocortis 2021      No             3{drops TID  Hydrocorti 

          



     one-Acetic one-Acetic 2-09                     _into_a      sone-Aceti     

      



     Acid 1-2 % Acid 1-2 % 00:00:                     ffected      c Acid 1-2   

        



               00                       _ear}      %              

 

     Hydrocortis Hydrocortis 2021      No             3{drops TID  Hydrocorti 

          



     one-Acetic one-Acetic 2-09                     _into_a      sone-Aceti     

      



     Acid 1-2 % Acid 1-2 % 00:00:                     ffected      c Acid 1-2   

        



               00                       _ear}      %              

 

     Hydrocortis Hydrocortis 2021      No             3{drops TID  Hydrocorti 

          



     one-Acetic one-Acetic 2-09                     _into_a      sone-Aceti     

      



     Acid 1-2 % Acid 1-2 % 00:00:                     ffected      c Acid 1-2   

        



               00                       _ear}      %              

 

     Hydrocortis Hydrocortis 2021      No             3{drops TID  Hydrocorti 

          



     one-Acetic one-Acetic 2-09                     _into_a      sone-Aceti     

      



     Acid 1-2 % Acid 1-2 % 00:00:                     ffected      c Acid 1-2   

        



               00                       _ear}      %              

 

     Hydrocortis Hydrocortis 2021      No             3{drops TID  Hydrocorti 

          



     one-Acetic one-Acetic 2-09                     _into_a      sone-Aceti     

      



     Acid 1-2 % Acid 1-2 % 00:00:                     ffected      c Acid 1-2   

        



               00                       _ear}      %              

 

     Hydrocortis Hydrocortis 2021      No             3{drops TID  Hydrocorti 

          



     one-Acetic one-Acetic 2-09                     _into_a      sone-Aceti     

      



     Acid 1-2 % Acid 1-2 % 00:00:                     ffected      c Acid 1-2   

        



               00                       _ear}      %              

 

     Hydrocortis Hydrocortis 2021      No             3{drops TID  Hydrocorti 

          



     one-Acetic one-Acetic 2-09                     _into_a      sone-Aceti     

      



     Acid 1-2 % Acid 1-2 % 00:00:                     ffected      c Acid 1-2   

        



               00                       _ear}      %              

 

     Hydrocortis Hydrocortis 2021      No             3{drops TID  Hydrocorti 

          



     one-Acetic one-Acetic 2-09                     _into_a      sone-Aceti     

      



     Acid 1-2 % Acid 1-2 % 00:00:                     ffected      c Acid 1-2   

        



               00                       _ear}      %              

 

     Hydrocortis Hydrocortis 2021      No             3{drops TID  Hydrocorti 

          



     one-Acetic one-Acetic 2-09                     _into_a      sone-Aceti     

      



     Acid 1-2 % Acid 1-2 % 00:00:                     ffected      c Acid 1-2   

        



               00                       _ear}      %              

 

     Hydrocortis Hydrocortis 2021      No             3{drops TID  Hydrocorti 

          



     one-Acetic one-Acetic 2-09                     _into_a      sone-Aceti     

      



     Acid 1-2 % Acid 1-2 % 00:00:                     ffected      c Acid 1-2   

        



               00                       _ear}      %              

 

     *Ketorolac *Ketorolac -0      No             .5mL                     C

ommon



     30mg/mL 30mg/mL                                               [facility]



               00:00:                                              - [facility]



                                                               [facility]

 

     *Ketorolac *Ketorolac -0      No             .5mL                     C

ommon



     30mg/mL 30mg/mL                                               [facility]



               00:00:                                              - [facility]



                                                               [facility]

 

     *Ketorolac *Ketorolac -0      No             .5mL                     C

ommon



     30mg/mL 30mg/mL                                               [facility]



               00:00:                                              - [facility]



                                                               [facility]

 

     *Ketorolac *Ketorolac -0      No             .5mL                     C

ommon



     30mg/mL 30mg/mL                                               [facility]



               00:00:                                              - [facility]



                                                               [facility]

 

     *Ketorolac *Ketorolac 2019-0      No             .5mL                     C

ommon



     30mg/mL 30mg/mL                                               [facility]



               00:00:                                              - [facility]



                                                               [facility]

 

     *Ketorolac *Ketorolac 2019-0      No             .5mL                     C

ommon



     30mg/mL 30mg/mL                                               [facility]



               00:00:                                              - [facility]



                                                               [facility]

 

     *Ketorolac *Ketorolac 2019-0      No             .5mL                     C

ommon



     30mg/mL 30mg/mL                                               [facility]



               00:00:                                              - [facility]



                                                               [facility]

 

     *Ketorolac *Ketorolac -0      No             .5mL                     C

ommon



     30mg/mL 30mg/mL                                               [facility]



               00:00:                                              - [facility]



                                                               [facility]

 

     *Ketorolac *Ketorolac -0      No             .5mL                     C

ommon



     30mg/mL 30mg/mL                                               [facility]



               00:00:                                              - [facility]



                                                               [facility]

 

     *Ketorolac *Ketorolac -0      No             .5mL                     C

ommon



     30mg/mL 30mg/mL                                               [facility]



               00:00:                                              - [facility]



                                                               [facility]

 

     *Ketorolac *Ketorolac -0      No             .5mL                     C

ommon



     30mg/mL 30mg/mL                                               [facility]



               00:00:                                              - [facility]



                                                               [facility]

 

     *Ketorolac *Ketorolac 0      No             .5mL                     C

ommon



     30mg/mL 30mg/mL                                               [facility]



               00:00:                                              - [facility]



                                                               [facility]

 

     *Ketorolac *Ketorolac -0      No             .5mL                     C

ommon



     30mg/mL 30mg/mL                                               [facility]



               00:00:                                              - [facility]



                                                               [facility]

 

     *Ketorolac *Ketorolac 0      No             .5mL                     C

ommon



     30mg/mL 30mg/mL                                               [facility]



               00:00:                                              - [facility]



                                                               [facility]

 

     *Ketorolac *Ketorolac 0      No             .5mL                     C

ommon



     30mg/mL 30mg/mL                                               [facility]



               00:00:                                              - [facility]



                                                               [facility]

 

     *Ketorolac *Ketorolac -0      No             .5mL                     C

ommon



     30mg/mL 30mg/mL                                               [facility]



               00:00:                                              - [facility]



                                                               [facility]

 

     *Ketorolac *Ketorolac -0      No             .5mL                     C

ommon



     30mg/mL 30mg/mL                                               [facility]



               00:00:                                              - [facility]



                                                               [facility]

 

     Omeprazole Omeprazole           Yes  Candido                1 capsule       

    Common



                              Llanes                30 minutes           [facility]



                                                  before           - [facility]



                                                  morning           [facility]

 

     Claritin Claritin           No                       Claritin           

 

     Tylenol Tylenol           No                       Tylenol           

 

     Multi Multi           No                       Multi           



     Vitamin Vitamin                                    Vitamin           

 

     Marti Root Marti Root           No                       Marti          

 



                                                  Root           

 

     Omeprazole Omeprazole           No                       Omeprazole        

   



     40 MG 40 MG                                    40 MG           

 

     Tumersaid Tumersaid           No                       Tumersaid           

 

     Claritin Claritin           No                       Claritin           

 

     Tylenol Tylenol           No                       Tylenol           

 

     Multi Multi           No                       Multi           



     Vitamin Vitamin                                    Vitamin           

 

     Marti Root Marti Root           No                       Marti          

 



                                                  Root           

 

     Omeprazole Omeprazole           No                       Omeprazole        

   



     40 MG 40 MG                                    40 MG           

 

     Tumersaid Tumersaid           No                       Tumersaid           

 

     Multi Multi           No                       Multi           



     Vitamin Vitamin                                    Vitamin           

 

     Tylenol Tylenol           No                       Tylenol           

 

     Claritin Claritin           No                       Claritin           

 

     Omeprazole Omeprazole           No                       Omeprazole        

   



     40 MG 40 MG                                    40 MG           

 

     Marti Root Marti Root           No                       Marti          

 



                                                  Root           

 

     Tumersaid Tumersaid           No                       Tumersaid           

 

     Multi Multi           No                       Multi           



     Vitamin Vitamin                                    Vitamin           

 

     Tylenol Tylenol           No                       Tylenol           

 

     Claritin Claritin           No                       Claritin           

 

     Omeprazole Omeprazole           No                       Omeprazole        

   



     40 MG 40 MG                                    40 MG           

 

     Marti Root Marti Root           No                       Marti          

 



                                                  Root           

 

     Tylenol Tylenol           No                       Tylenol           

 

     Marti Root Marti Root           No                       Marti          

 



                                                  Root           

 

     Tumersaid Tumersaid           No                       Tumersaid           

 

     Omeprazole Omeprazole           No                       Omeprazole        

   



     40 MG 40 MG                                    40 MG           

 

     Claritin Claritin           No                       Claritin           

 

     Multi Multi           No                       Multi           



     Vitamin Vitamin                                    Vitamin           

 

     Multi Multi           No                       Multi           



     Vitamin Vitamin                                    Vitamin           

 

     Marti Root Marti Root           No                       Marti          

 



                                                  Root           

 

     Tylenol Tylenol           No                       Tylenol           

 

     Omeprazole Omeprazole           No                       Omeprazole        

   



     40 MG 40 MG                                    40 MG           

 

     Claritin Claritin           No                       Claritin           

 

     Tumersaid Tumersaid           No                       Tumersaid           

 

     Multi Multi           No                       Multi           



     Vitamin Vitamin                                    Vitamin           

 

     Marti Root Marti Root           No                       Marti          

 



                                                  Root           

 

     Tylenol Tylenol           No                       Tylenol           

 

     Omeprazole Omeprazole           No                       Omeprazole        

   



     40 MG 40 MG                                    40 MG           

 

     Claritin Claritin           No                       Claritin           

 

     Tumersaid Tumersaid           No                       Tumersaid           

 

     Marti Root Marti Root           No                       Marti          

 



                                                  Root           

 

     Claritin Claritin           No                       Claritin           

 

     Tylenol Tylenol           No                       Tylenol           

 

     Tumersaid Tumersaid           No                       Tumersaid           

 

     Multi Multi           No                       Multi           



     Vitamin Vitamin                                    Vitamin           

 

     Omeprazole Omeprazole           No                       Omeprazole        

   



     40 MG 40 MG                                    40 MG           

 

     Tylenol Tylenol           No                       Tylenol           

 

     Omeprazole Omeprazole           No                       Omeprazole        

   



     40 MG 40 MG                                    40 MG           

 

     Marti Root Marti Root           No                       Marti          

 



                                                  Root           

 

     Multi Multi           No                       Multi           



     Vitamin Vitamin                                    Vitamin           

 

     Claritin Claritin           No                       Claritin           

 

     Tumersaid Tumersaid           No                       Tumersaid           

 

     Tylenol Tylenol           No                       Tylenol           

 

     Omeprazole Omeprazole           No                       Omeprazole        

   



     40 MG 40 MG                                    40 MG           

 

     Marti Root Marti Root           No                       Marti          

 



                                                  Root           

 

     Multi Multi           No                       Multi           



     Vitamin Vitamin                                    Vitamin           

 

     Claritin Claritin           No                       Claritin           

 

     Tumersaid Tumersaid           No                       Tumersaid           

 

     Tylenol Tylenol           No                       Tylenol           

 

     Omeprazole Omeprazole           No                       Omeprazole        

   



     40 MG 40 MG                                    40 MG           

 

     Marti Root Marti Root           No                       Marti          

 



                                                  Root           

 

     Multi Multi           No                       Multi           



     Vitamin Vitamin                                    Vitamin           

 

     Claritin Claritin           No                       Claritin           

 

     Tumersaid Tumersaid           No                       Tumersaid           

 

     Tylenol Tylenol           No                       Tylenol           

 

     Omeprazole Omeprazole           No                       Omeprazole        

   



     40 MG 40 MG                                    40 MG           

 

     Marti Root Marti Root           No                       Marti          

 



                                                  Root           

 

     Multi Multi           No                       Multi           



     Vitamin Vitamin                                    Vitamin           

 

     Claritin Claritin           No                       Claritin           

 

     Tumersaid Tumersaid           No                       Tumersaid           

 

     Tylenol Tylenol           No                       Tylenol           

 

     Tumersaid Tumersaid           No                       Tumersaid           

 

     Omeprazole Omeprazole           No                       Omeprazole        

   



     40 MG 40 MG                                    40 MG           

 

     Multi Multi           No                       Multi           



     Vitamin Vitamin                                    Vitamin           

 

     Claritin Claritin           No                       Claritin           

 

     Marti Root Marti Root           No                       Marti          

 



                                                  Root           

 

     Tumersaid Tumersaid           No                       Tumersaid           

 

     Tylenol Tylenol           No                       Tylenol           

 

     Omeprazole Omeprazole           No                       Omeprazole        

   



     40 MG 40 MG                                    40 MG           

 

     Marti Root Marti Root           No                       Marti          

 



                                                  Root           

 

     Multi Multi           No                       Multi           



     Vitamin Vitamin                                    Vitamin           

 

     Claritin Claritin           No                       Claritin           

 

     Tylenol Tylenol           No                       Tylenol           

 

     Multi Multi           No                       Multi           



     Vitamin Vitamin                                    Vitamin           

 

     Marti Root Marti Root           No                       Marti          

 



                                                  Root           

 

     Claritin Claritin           No                       Claritin           

 

     Tumersaid Tumersaid           No                       Tumersaid           

 

     Omeprazole Omeprazole           No                       Omeprazole        

   



     40 MG 40 MG                                    40 MG           

 

     Tylenol Tylenol           No                       Tylenol           

 

     Multi Multi           No                       Multi           



     Vitamin Vitamin                                    Vitamin           

 

     Marti Root Marti Root           No                       Marti          

 



                                                  Root           

 

     Claritin Claritin           No                       Claritin           

 

     Tumersaid Tumersaid           No                       Tumersaid           

 

     Omeprazole Omeprazole           No                       Omeprazole        

   



     40 MG 40 MG                                    40 MG           

 

     Marti Root Marti Root           No                       Marti          

 



                                                  Root           

 

     Claritin Claritin           No                       Claritin           

 

     Multi Multi           No                       Multi           



     Vitamin Vitamin                                    Vitamin           

 

     Tumersaid Tumersaid           No                       Tumersaid           

 

     Omeprazole Omeprazole           No                       Omeprazole        

   



     40 MG 40 MG                                    40 MG           

 

     Tylenol Tylenol           No                       Tylenol           

 

     Marti Root Marti Root           No                       Marti          

 



                                                  Root           

 

     Claritin Claritin           No                       Claritin           

 

     Multi Multi           No                       Multi           



     Vitamin Vitamin                                    Vitamin           

 

     Tumersaid Tumersaid           No                       Tumersaid           

 

     Omeprazole Omeprazole           No                       Omeprazole        

   



     40 MG 40 MG                                    40 MG           

 

     Tylenol Tylenol           No                       Tylenol           

 

     Multi Multi           No                       Multi           



     Vitamin Vitamin                                    Vitamin           

 

     Omeprazole Omeprazole           No                       Omeprazole        

   



     40 MG 40 MG                                    40 MG           

 

     Marti Root Marti Root           No                       Marti          

 



                                                  Root           

 

     Tylenol Tylenol           No                       Tylenol           

 

     Claritin Claritin           No                       Claritin           

 

     Tumersaid Tumersaid           No                       Tumersaid           

 

     Multi Multi           No                       Multi           



     Vitamin Vitamin                                    Vitamin           

 

     Omeprazole Omeprazole           No                       Omeprazole        

   



     40 MG 40 MG                                    40 MG           

 

     Marti Root Marti Root           No                       Marti          

 



                                                  Root           

 

     Tylenol Tylenol           No                       Tylenol           

 

     Claritin Claritin           No                       Claritin           

 

     Tumersaid Tumersaid           No                       Tumersaid           

 

     Multi Multi           No                       Multi           



     Vitamin Vitamin                                    Vitamin           

 

     Omeprazole Omeprazole           No                       Omeprazole        

   



     40 MG 40 MG                                    40 MG           

 

     Marti Root Marti Root           No                       Marti          

 



                                                  Root           

 

     Tylenol Tylenol           No                       Tylenol           

 

     Claritin Claritin           No                       Claritin           

 

     Tumersaid Tumersaid           No                       Tumersaid           

 

     Multi Multi           No                       Multi           



     Vitamin Vitamin                                    Vitamin           

 

     Omeprazole Omeprazole           No                       Omeprazole        

   



     40 MG 40 MG                                    40 MG           

 

     Marti Root Marti Root           No                       Marti          

 



                                                  Root           

 

     Tylenol Tylenol           No                       Tylenol           

 

     Claritin Claritin           No                       Claritin           

 

     Tumersaid Tumersaid           No                       Tumersaid           

 

     Tumersaid Tumersaid           No                       Tumersaid           

 

     Tylenol Tylenol           No                       Tylenol           

 

     Omeprazole Omeprazole           No                       Omeprazole        

   



     40 MG 40 MG                                    40 MG           

 

     Marti Root Marti Root           No                       Marti          

 



                                                  Root           

 

     amLODIPine amLODIPine           No                  QD   amLODIPine        

   



     Besylate 10 Besylate 10                                    Besylate        

   



     MG   MG                                      10 MG           

 

     Multi Multi           No                       Multi           



     Vitamin Vitamin                                    Vitamin           

 

     Claritin Claritin           No                       Claritin           

 

     Tumersaid Tumersaid           No                       Tumersaid           

 

     Tylenol Tylenol           No                       Tylenol           

 

     Omeprazole Omeprazole           No                       Omeprazole        

   



     40 MG 40 MG                                    40 MG           

 

     Marti Root Marti Root           No                       Marti          

 



                                                  Root           

 

     amLODIPine amLODIPine           No                  QD   amLODIPine        

   



     Besylate 10 Besylate 10                                    Besylate        

   



     MG   MG                                      10 MG           

 

     Multi Multi           No                       Multi           



     Vitamin Vitamin                                    Vitamin           

 

     Claritin Claritin           No                       Claritin           

 

     Omeprazole Omeprazole           No                       Omeprazole        

   



     40 MG 40 MG                                    40 MG           

 

     Marti Root Marti Root           No                       Marti          

 



                                                  Root           

 

     Multi Multi           No                       Multi           



     Vitamin Vitamin                                    Vitamin           

 

     Tumersaid Tumersaid           No                       Tumersaid           

 

     Tumersaid Tumersaid           No                       Tumersaid           







Immunizations







           Ordered Immunization Filled Immunization Date       Status     Commen

ts   Source



           Name       Name                                        

 

           FLUZONE HIGH DOSE FLUZONE HIGH DOSE 2022 Completed             

[facility]



           OVER 65    OVER 65    15:01:00                         - [facility]

 

           FLUZONE HIGH DOSE FLUZONE HIGH DOSE 2022 Completed             

[facility]



           OVER 65    OVER 65    15:01:00                         - [facility]

 

           FLUZONE HIGH DOSE FLUZONE HIGH DOSE 2022 Completed             

[facility]



           OVER 65    OVER 65    15:01:00                         - [facility]

 

           FLUZONE HIGH DOSE FLUZONE HIGH DOSE 2022 Completed             

[facility]



           OVER 65    OVER 65    15:01:00                         - [facility]

 

           FLUZONE HIGH DOSE FLUZONE HIGH DOSE 2022 Completed             

[facility]



           OVER 65    OVER 65    15:01:00                         - [facility]

 

           FLUZONE HIGH DOSE FLUZONE HIGH DOSE 2022 Completed             

[facility]



           OVER 65    OVER 65    15:01:00                         - [facility]

 

           FLUZONE HIGH DOSE FLUZONE HIGH DOSE 2022 Completed             

[facility]



           OVER 65    OVER 65    15:01:00                         - [facility]

 

           FLUZONE HIGH DOSE FLUZONE HIGH DOSE 2022 Completed             

[facility]



           OVER 65    OVER 65    15:01:00                         - [facility]

 

           FLUZONE HIGH DOSE FLUZONE HIGH DOSE 2022 Completed             

[facility]



           OVER 65    OVER 65    15:01:00                         - [facility]

 

           FLUZONE HIGH DOSE FLUZONE HIGH DOSE 2022 Completed             

[facility]



           OVER 65    OVER 65    15:01:00                         - [facility]

 

           Fluzone    Fluzone    2021 Completed             [facility]



                                 13:28:00                         - [facility]

 

           Fluzone    Fluzone    2021 Completed             [facility]



                                 13:28:00                         - [facility]

 

           Fluzone    Fluzone    2021 Completed             [facility]



                                 13:28:00                         - [facility]

 

           Fluzone    Fluzone    2021 Completed             [facility]



                                 13:28:00                         - [facility]

 

           Fluzone    Fluzone    2021 Completed             [facility]



                                 13:28:00                         - [facility]

 

           Fluzone    Fluzone    2021 Completed             [facility]



                                 13:28:00                         - [facility]

 

           Fluzone    Fluzone    2021 Completed             [facility]



                                 13:28:00                         - [facility]

 

           Fluzone    Fluzone    2021 Completed             [facility]



                                 13:28:00                         - [facility]

 

           Fluzone    Fluzone    2021 Completed             [facility]



                                 13:28:00                         - [facility]

 

           Fluzone    Fluzone    2021 Completed             [facility]



                                 13:28:00                         - [facility]

 

           Fluzone    Fluzone    2021 Completed             [facility]



                                 13:28:00                         - [facility]

 

           Fluzone    Fluzone    2021 Completed             [facility]



                                 13:28:00                         - [facility]

 

           Fluzone    Fluzone    2021 Completed             [facility]



                                 13:28:00                         - [facility]

 

           Fluzone    Fluzone    2021 Completed             [facility]



                                 13:28:00                         - [facility]

 

           Fluzone    Fluzone    2021 Completed             [facility]



                                 13:28:00                         - [facility]

 

           Fluzone    Fluzone    2021 Completed             [facility]



                                 13:28:00                         - [facility]

 

           Fluzone    Fluzone    2021 Completed             [facility]



                                 13:28:00                         - [facility]

 

           Fluzone    Fluzone    2021 Completed             [facility]



                                 13:28:00                         - [facility]

 

           Fluzone    Fluzone    2021 Completed             [facility]



                                 13:28:00                         - [facility]

 

           Fluzone    Fluzone    2021 Completed             [facility]



                                 13:28:00                         - [facility]

 

           Fluzone    Fluzone    2021 Completed             [facility]



                                 13:28:00                         - [facility]

 

           Fluzone    Fluzone    2021 Completed             [facility]



                                 13:28:00                         - [facility]

 

           FluAD      FluAD      2019 Completed             [facility]



                                 10:49:00                         - [facility]

 

           FluAD      FluAD      2019 Completed             [facility]



                                 10:49:00                         - [facility]

 

           FluAD      FluAD      2019 Completed             [facility]



                                 10:49:00                         - [facility]

 

           FluAD      FluAD      2019 Completed             [facility]



                                 10:49:00                         - [facility]

 

           FluAD      FluAD      2019 Completed             [facility]



                                 10:49:00                         - [facility]

 

           FluAD      FluAD      2019 Completed             [facility]



                                 10:49:00                         - [facility]

 

           FluAD      FluAD      2019 Completed             [facility]



                                 10:49:00                         - [facility]

 

           FluAD      FluAD      2019 Completed             [facility]



                                 10:49:00                         - [facility]

 

           FluAD      FluAD      2019 Completed             [facility]



                                 10:49:00                         - [facility]

 

           FluAD      FluAD      2019 Completed             [facility]



                                 10:49:00                         - [facility]

 

           FluAD      FluAD      2019 Completed             [facility]



                                 10:49:00                         - [facility]

 

           FluAD      FluAD      2019 Completed             [facility]



                                 10:49:00                         - [facility]

 

           FluAD      FluAD      2019 Completed             [facility]



                                 10:49:00                         - [facility]

 

           FluAD      FluAD      2019 Completed             [facility]



                                 10:49:00                         - [facility]

 

           FluAD      FluAD      2019 Completed             [facility]



                                 10:49:00                         - [facility]

 

           FluAD      FluAD      2019 Completed             [facility]



                                 10:49:00                         - [facility]

 

           FluAD      FluAD      2019 Completed             [facility]



                                 10:49:00                         - [facility]

 

           FluAD      FluAD      2019 Completed             [facility]



                                 10:49:00                         - [facility]

 

           FluAD      FluAD      2019 Completed             [facility]



                                 10:49:00                         - [facility]

 

           FluAD      FluAD      2019 Completed             [facility]



                                 10:49:00                         - [facility]

 

           FluAD      FluAD      2019 Completed             [facility]



                                 10:49:00                         - [facility]

 

           FluAD      FluAD      2019 Completed             [facility]



                                 10:49:00                         - [facility]

 

           FluAD      FluAD      2019 Completed             [facility]



                                 10:49:00                         - [facility]

 

           FluAD      FluAD      2019 Completed             [facility]



                                 10:49:00                         - [facility]

 

           FluAD      FluAD      2019 Completed             [facility]



                                 10:49:00                         - [facility]

 

           Shingrix   Shingrix   2019 Completed             [facility]



                                 10:18:00                         - [facility]

 

           Shingrix   Shingrix   2019 Completed             [facility]



                                 10:18:00                         - [facility]

 

           Shingrix   Shingrix   2019 Completed             [facility]



                                 10:18:00                         - [facility]

 

           Shingrix   Shingrix   2019 Completed             [facility]



                                 10:18:00                         - [facility]

 

           Shingrix   Shingrix   2019 Completed             [facility]



                                 10:18:00                         - [facility]

 

           Shingrix   Shingrix   2019 Completed             [facility]



                                 10:18:00                         - [facility]

 

           Shingrix   Shingrix   2019 Completed             [facility]



                                 10:18:00                         - [facility]

 

           Shingrix   Shingrix   2019 Completed             [facility]



                                 10:18:00                         - [facility]

 

           Shingrix   Shingrix   2019 Completed             [facility]



                                 10:18:00                         - [facility]

 

           Shingrix   Shingrix   2019 Completed             [facility]



                                 10:18:00                         - [facility]

 

           Shingrix   Shingrix   2019 Completed             [facility]



                                 10:18:00                         - [facility]

 

           Shingrix   Shingrix   2019 Completed             [facility]



                                 10:18:00                         - [facility]

 

           Shingrix   Shingrix   2019 Completed             [facility]



                                 10:18:00                         - [facility]

 

           Shingrix   Shingrix   2019 Completed             [facility]



                                 10:18:00                         - [facility]

 

           Shingrix   Shingrix   2019 Completed             [facility]



                                 10:18:00                         - [facility]

 

           Shingrix   Shingrix   2019 Completed             [facility]



                                 10:18:00                         - [facility]

 

           Shingrix   Shingrix   2019 Completed             [facility]



                                 10:18:00                         - [facility]

 

           Shingrix   Shingrix   2019 Completed             [facility]



                                 10:18:00                         - [facility]

 

           Shingrix   Shingrix   2019 Completed             [facility]



                                 10:18:00                         - [facility]

 

           Shingrix   Shingrix   2019 Completed             [facility]



                                 10:18:00                         - [facility]

 

           Shingrix   Shingrix   2019 Completed             [facility]



                                 10:18:00                         - [facility]

 

           Shingrix   Shingrix   2019 Completed             [facility]



                                 10:18:00                         - [facility]

 

           Shingrix   Shingrix   2019 Completed             [facility]



                                 10:18:00                         - [facility]

 

           Shingrix   Shingrix   2019 Completed             [facility]



                                 10:18:00                         - [facility]

 

           Shingrix   Shingrix   2019 Completed             [facility]



                                 10:18:00                         [facility]

 

           *Ketorolac 30mg/mL *Ketorolac 30mg/mL 2019 Completed           

  [facility]



                                 11:47:00                         - [facility]







Vital Signs







             Vital Name   Observation Time Observation Value Comments     Source

 

             height       2023 14:10:00 60 [in_i]                 [facility]

 

             weight       2023 14:10:00 165 [lb_av]               [facility]

 

             temperature  2023 14:10:00 97.9 [degF]               [facility]

 

             bmi          2023 14:10:00 32.22 kg/m2               [facility]

 

             oximetry     2023 14:10:00 97 %                      [facility]

 

             respiratory rate 2023 14:10:00 16 /min                   Comm

on [facility]

 

             blood pressure 2023 14:10:00 137 mm[Hg]                [facility] -



             systolic                                            [facility]

 

             blood pressure 2023 14:10:00 76 mm[Hg]                 [facility] -



             diastolic                                           [facility]

 

             height       2023 15:00:00 60 [in_i]                 [facility]

 

             weight       2023 15:00:00 165 [lb_av]               [facility]

 

             temperature  2023 15:00:00 97.9 [degF]               [facility]

 

             bmi          2023 15:00:00 32.22 kg/m2               [facility]

 

             oximetry     2023 15:00:00 97 %                      [facility]

 

             respiratory rate 2023 15:00:00 16 /min                   Comm

on [facility]

 

             blood pressure 2023 15:00:00 137 mm[Hg]                [facility]



             systolic                                            [facility]

 

             blood pressure 2023 15:00:00 76 mm[Hg]                 [facility]



             diastolic                                           [facility]

 

             height       2022 15:00:00 60 [in_i]                 [facility]

[facility]

 

             weight       2022 15:00:00 156 [lb_av]               [facility]

 

             temperature  2022 15:00:00 98.1 [degF]               [facility]

 

             bmi          2022 15:00:00 30.46 kg/m2               [facility]

[facility]

 

             height       2022 10:00:00 60 [in_i]                 [facility]

 

             weight       2022 10:00:00 164.0 [lb_av]              [facility]

 

             temperature  2022 10:00:00 97.9 [degF]               [facility]

 

             bmi          2022 10:00:00 32.03 kg/m2               [facility]

[facility]

 

             blood pressure 2022 10:00:00 134 mm[Hg]                [facility]

 [facility] -



             systolic                                            [facility]

 

             blood pressure 2022 10:00:00 84 mm[Hg]                 [facility] -



             diastolic                                           [facility]

 

             height       2022 14:50:00 60 [in_i]                 [facility]

[facility]

 

             weight       2022 14:50:00 164.1 [lb_av]              [facility]

 

             temperature  2022 14:50:00 97.8 [degF]               [facility]

[facility]

 

             bmi          2022 14:50:00 32.05 kg/m2               [facility]

 

             oximetry     2022 14:50:00 98 %                      [facility]

[facility]

 

             respiratory rate 2022 14:50:00 18 /min                   Comm

on [facility]

 

             blood pressure 2022 14:50:00 132 mm[Hg]                [facility]

 [facility] -



             systolic                                            [facility]

 

             blood pressure 2022 14:50:00 67 mm[Hg]                 [facility]

 [facility] -



             diastolic                                           [facility]

 

             height       2022 11:40:00 60 [in_i]                 [facility]

 

             weight       2022 11:40:00 167.9 [lb_av]              [facility] 

[facility]

 

             bmi          2022 11:40:00 32.79 kg/m2               [facility]

 

             height       2022 14:50:00 60 [in_i]                 [facility]

 

             weight       2022 14:50:00 167.9 [lb_av]              [facility]

 

             temperature  2022 14:50:00 97.4 [degF]               [facility]

 

             bmi          2022 14:50:00 32.79 kg/m2               [facility]

 

             oximetry     2022 14:50:00 97 %                      [facility]

 

             respiratory rate 2022 14:50:00 17 /min                   Comm

on [facility]

 

             blood pressure 2022 14:50:00 135 mm[Hg]                [facility]

 [facility] -



             systolic                                            [facility]

 

             blood pressure 2022 14:50:00 78 mm[Hg]                 [facility]

 [facility] -



             diastolic                                           [facility]

 

             height       2022 10:50:00 60 [in_i]                 [facility]

 

             weight       2022 10:50:00 162.4 [lb_av]              [facility]

 

             temperature  2022 10:50:00 97.1 [degF]               [facility]

 

             bmi          2022 10:50:00 31.71 kg/m2               [facility]

 

             oximetry     2022 10:50:00 98 %                      [facility]

 

             respiratory rate 2022 10:50:00 17 /min                   Comm

on [facility]

 

             blood pressure 2022 10:50:00 132 mm[Hg]                [facility]

 [facility] -



             systolic                                            [facility]

 

             blood pressure 2022 10:50:00 72 mm[Hg]                 Common

 [facility] -



             diastolic                                           [facility]

 

             height       2022 13:00:00 60 [in_i]                 [facility]

 

             weight       2022 13:00:00 158 [lb_av]               [facility]

 

             temperature  2022 13:00:00 98.2 [degF]               [facility]

 

             bmi          2022 13:00:00 30.85 kg/m2               [facility]

 

             blood pressure 2022 13:00:00 140 mm[Hg]                Common

 [facility] -



             systolic                                            [facility]

 

             blood pressure 2022 13:00:00 86 mm[Hg]                 Common

 [facility] -



             diastolic                                           [facility]

 

             height       2021 13:40:00 60 [in_i]                 [facility]

 

             weight       2021 13:40:00 165.0 [lb_av]              [facility]

 

             temperature  2021 13:40:00 97.3 [degF]               Common [facility]

 

             bmi          2021 13:40:00 32.22 kg/m2               [facility]

 

             oximetry     2021 13:40:00 94 %                      [facility]

 

             respiratory rate 2021 13:40:00 17 /min                   Comm

on [facility]

 

             blood pressure 2021 13:40:00 135 mm[Hg]                Common

 [facility] -



             systolic                                            [facility]

 

             blood pressure 2021 13:40:00 74 mm[Hg]                 Common

 [facility] -



             diastolic                                           [facility]

 

             height       2021 10:30:00 60 [in_i]                 Common S

[facility]

 

             weight       2021 10:30:00 158 [lb_av]               [facility] S

[facility]

 

             temperature  2021 10:30:00 97.3 [degF]               Common S

[facility]

 

             bmi          2021 10:30:00 30.85 kg/m2               Common S

pirit -



                                                                 [facility]

 

             blood pressure 2021 10:30:00 132 mm[Hg]                Common

 [facility] -



             systolic                                            [facility]

 

             blood pressure 2021 10:30:00 78 mm[Hg]                 Common

 [facility] -



             diastolic                                           [facility]

 

             height       2021 11:40:00 60 [in_i]                 Common [facility]

 

             weight       2021 11:40:00 167.2 [lb_av]              Common 

[facility]

 

             temperature  2021 11:40:00 97.5 [degF]               [facility]

 

             bmi          2021 11:40:00 32.65 kg/m2               [facility]

 

             oximetry     2021 11:40:00 98 %                      [facility]

 

             respiratory rate 2021 11:40:00 17 /min                   Comm

on [facility]

 

             blood pressure 2021 11:40:00 138 mm[Hg]                Common

 [facility] -



             systolic                                            [facility]

 

             blood pressure 2021 11:40:00 64 mm[Hg]                 Common

 [facility] -



             diastolic                                           [facility]







Procedures

This patient has no known procedures.



Encounters







        Start   End     Encounter Admission Attending Care    Care    Encounter 

Source



        Date/Time Date/Time Type    Type    Clinicians Facility Department ID   

   

 

        2022         Outpatient         LlanesPATTY marquez  [facility]  [id number]

 Common



        13:19:01                         Candido                     [facility]

 

        2022         Outpatient         Mario Alberto[facility]OTTENYU [facility]  [id number]

 Common



        08:21:00                         Candido                     [facility]

 

        2022         Outpatient         Llanes,  [facility]  [facility]  [id number]

 Common



        13:46:00                         Candido                     [facility]

 

        2022         Outpatient         Llanes,  STLMLC  [facility]  [id number]

 Common



        14:14:00                         Candido                     [facility]

 

        2022         Outpatient         Llanes,  STLMLC  [facility]  [id number]

 Common



        14:38:28                         Candido                     [facility]

 

        2022         Outpatient         Llanes,  STLMLC  [facility]  [id number]

 Common



        14:34:31                         Candido                     [facility]

 

        2022         Outpatient         Llanes,  STLMLC  [facility]  [id number]

 Common



        14:08:52                         Candido                  [id number]   [facility]

 

        2022         Outpatient         Llanes,  STLMLC  [facility]  [id number]

 Common



        14:02:30                         Candido                  [id number]   [facility]

 

        2022         Outpatient         Llanes,  STLMLC  [facility]  [id number]

 Common



        13:06:55                         Candido                  [id number]   [facility]

 

        2022         Outpatient         Llanes,  STLMLC  [facility]  [id number]

 Common



        11:23:13                         Candido                  [id number]   [facility]

 

        2022         Outpatient         Llanes,  STLMLC  [facility]  [id number]

 Common



        11:18:11                         Candido                  [id number]   [facility]

 

        2022         Outpatient         Llanes,  STLMLC  ST[facility]  [id number]

 Common



        11:04:00                         Candido                  [id number]   [facility]

 

        2022         Outpatient         Llanes,  STLMLC  ST[facility]  [id number]

 Common



        11:03:06                         Candido                  [id number]   [facility]

 

        2022         Outpatient         Llanes,  STLMLC  ST[facility]  [id number]

 Common



        11:02:49                         Candido                  [id number]   [facility]

 

        2022         Outpatient         Llanes,  STLMLC  STL[facility]  [id number]

 Common



        11:00:26                         Candido                  [id number]   [facility]

 

        2023 OFFICE                  STL[facility]C  [facility]  [id number] Co

mmon



        00:00:00 00:00:00 VISIT EST                                         Spir

it



                        PT LEVEL 3                                         - [facility]



                                                                        [facility]

 

        2023 SUB ANNUAL                 STLMLC  STL[facility]C  [id number]

 Common



        00:00:00 00:00:00 MCR                                             [facility]



                        WELLNESS                                         - [facility]



                        VISIT                                           [facility]

 

        2023 (TEL)                   STLMLC  STLMLC  [id number] Co

mmon



        00:00:00 00:00:00                                                 [facility]

 

        2022 (TEL)                   STLMLC  STLMLC  [id number] Co

mmon



        00:00:00 00:00:00                                                 [facility]

 

        2022 OFFICE                  STLMLC  STLMLC  [id number] Co

mmon



        00:00:00 00:00:00 VISIT EST                                         Spir

it



                        PT LEVEL 3                                         - [facility]



                                                                        [facility]

 

        2022 OFFICE                  STLMLC  STLMLC  [id number] Co

mmon



        00:00:00 00:00:00 VISIT                                           [facility]



                        ESTAB PT                                         - [facility]



                        LEVEL 4                                         [facility]

 

        2022 (TEL)                   STLMLC  STLMLC  [id number] Co

mmon



        00:00:00 00:00:00                                                 [facility]

 

        2022 (TEL)                   STLMLC  STLMLC  [id number] Co

mmon



        00:00:00 00:00:00                                                 [facility]

 

        2022-10-26 2022-10-26 (TEL)                   STLMLC  STLMLC  [id number] Co

mmon



        00:00:00 00:00:00                                                 [facility]

 

        2022 OFFICE                  STLMLC  STLMLC  [id number] Co

mmon



        00:00:00 00:00:00 VISIT                                           [facility]



                        ESTAB PT                                         - [facility]



                        LEVEL 4                                         [facility]

 

        2022-07-15 2022-07-15 (TEL)                   STLMLC  STLMLC  [id number] Co

mmon



        00:00:00 00:00:00                                                 [facility]

 

        2022 (TEL)                   STLMLC  STLMLC  [id number] Co

mmon



        00:00:00 00:00:00                                                 [facility]

 

        2022 OFFICE                  STLMLC  STLMLC  [id number] Co

mmon



        00:00:00 00:00:00 VISIT EST                                         Spir

it



                        PT LEVEL 3                                         - [facility]

 

        2022 (TEL)                   STLMLC  STLMLC  [id number] Co

mmon



        00:00:00 00:00:00                                                 [facility]

 

        2022 (TEL)                   STLMLC  STLMLC  [id number] Co

mmon



        00:00:00 00:00:00                                                 [facility]

 

        2022 (TEL)                   STLMLC  STLMLC  [id number] Co

mmon



        00:00:00 00:00:00                                                 [facility]

 

        2022 OFFICE                  STLMLC  STLMLC  [id number] Co

mmon



        00:00:00 00:00:00 VISIT                                           [facility]



                        ESTAB PT                                         - [facility]



                        LEVEL 4                                         [facility]

 

        2022 PREV VISIT                 STLMLC  STLMLC  [id number]

 Common



        00:00:00 00:00:00 EST AGE 65                                         Spi

rit



                        & OVER                                          - [facility]

 

        2022 OFFICE                  STLMLC  STLMLC  [id number] Co

mmon



        00:00:00 00:00:00 VISIT                                           [facility]



                        ESTAB PT                                         - [facility]



                        LEVEL 4                                         [facility]

 

        2021 Outpatient EL      Yahir,   HCACL   DMITRY    [id number]

[id number] [facility]



        12:00:00 12:00:00                 Luis Alberto Rodriguez      [facility]

 

        2021 (TEL)                   STLMLC  STLMLC  [id number] Co

mmon



        00:00:00 00:00:00                                                 [facility]

 

        2021 OFFICE                  STLMLC  STLMLC  [id number] Co

mmon



        00:00:00 00:00:00 VISIT EST                                         Spir

it



                        PT LEVEL 3                                         - [facility]

 

        2021 (TEL)                   STLMLC  STLMLC  [id number] Co

mmon



        00:00:00 00:00:00                                                 [facility]

 

        2021 Outpatient RAY Sinclair,   RENEE ANDRES    [id number]

[id number] [facility]



        12:00:00 12:00:00                 Luis Alberto                 45      [facility]

 

        2021 Outpatient RAY Sinclair,   RENEE ANDRES    [id number]

[id number] [facility]



        12:00:00 12:00:00                 Luis Alberto                 61      [facility]

 

        2021 (TEL)                   STLMLC  STLMLC  [id number] Co

mmon



        00:00:00 00:00:00                                                 [facility]

 

        2021 OFFICE                  STLMLC  STLMLC  [id number] Co

mmon



        00:00:00 00:00:00 VISIT                                           [facility]



                        ESTAB PT                                         - [facility]



                        LEVEL 4                                         [facility]

 

        2021 OFFICE                  STLMLC  STLMLC  [id number] Co

mmon



        00:00:00 00:00:00 VISIT EST                                         Spir

it



                        PT LEVEL 3                                         [facility]

 

        2021 Outpatient                 STLMLC  STLMLC  [id number]

 Common



        00:00:00 00:00:00                                                 [facility]

 

        2021 Outpatient                 STLMLC  STLMLC  [id number]

 Common



        00:00:00 00:00:00                                                 [facility]

 

        2021 Outpatient                 STLMLC  STLMLC  [id number]

 Common



        00:00:00 00:00:00                                                 [facility]

 

        2020 Outpatient                 STLMLC  STLMLC  [id number]

 Common



        00:00:00 00:00:00                                                 [facility]

 

        2020 Outpatient                 [id number]

[id number] Common



        10:30:00 10:30:00                         [place]   [address]         Spir

it



                                                Drive   [facility]

 

        2020 Outpatient                 [id number]

[id number] Common



        10:45:00 10:45:00                         [place]   [address]         Spir

it



                                                Drive   [facility]

 

        2020 Outpatient                 [id number]

[id number] Common



        16:47:00 16:47:00                         [place]   [address]         Spir

it



                                                Drive   [facility]

 

        2020 Outpatient                 [id number]

[id number] Common



        14:40:00 14:40:00                         [facility]         Spir

it



                                                Road    [facility]







Results







           Test Description Test Time  Test Comments Results    Result Comments 

Source









                    SURGICAL PATH SPECIMENS 2021 15:09:00 









                      Test Item  Value      Reference Range Interpretation Comme

nts









                          SURGICAL                  

--------------------------------------------------------------------------------

------------RUN                                             



                          PATH                      DATE: 21 [place] - 

LAB PAGE 1 RUN TIME: 1509  Specimen Inquiry RUN

 USER: INTERFACE                                            



                          SPECIMENS                 

--------------------------------------------------------------------------------

------------AMY                                            



                          (test code                ENT: NIGEL LEZAMA ACCT #: [id number] LOC: LUZ MARINA ASCENCIO #: 

[id number] AGE/SX: 71/F                                         



                          = S)                      ROOM: [id number] DR: MARY ALICE marquez Referred : 50 BED: DIS: STATUS: 

PRE REF TLOC:                                               



                                                    

--------------------------------------------------------------------------------

------------                                                



                                                    SPEC #: 21:CL: RECD:  STATUS: DAMIR OLVERA #: [id number] MEREDITH: 

21- SUBM DR:                                          



                                                    Fanta Rivero MD ENTERE

D:  SP TYPE: SURG SPEC OTHR DR: 

Luis Alberto Sinclair MD                                             



                                                    ORDERED:  [id number]  CODES:

 I16484 - BREAST, NOS COPIES TO: Fanta Rivero MD [address]                                           



                                                    [address] [place] [phone]

[phone] Luis Alberto Sinclair MD [address] [place] [phone] PROCEDURES:  

L4 [id number] () TISSUES: BREAST, NOS - 

RIGHT BX FINAL                                              



                                                    DIAGNOSIS Right breast, 

:00, positive and negative for calcifications, core

 biopsy:                                                    



                                                    Fibroadenoma with associated

 microcalcifications and fibrocystic changes. GROSS

 AND MICROSCOPIC                                            



                                                    GROSS EXAMINATION: #1 shows 

2 core biopsies measuring up to 1.5 cm each. 

Entirely submitted A.                                         



                                                    #2 shows 4 core biopsies jimbo

suring up to 1.5 cm each. Entirely submitted B. 

MICROSCOPIC                                                 



                                                    EXAMINATION: Sections reveal

 fibroadenoma with associated microcalcifications 

and fibrocystic                                             



                          changes. REVIEWED BY:                           



                                                    

SONNY------------------------------------------------------------------------------

--------------                                              



                          Signed ________________________________ Dilip Bowman 21 1509                           



                                                    

--------------------------------------------------------------------------------

------------  **                                            



                          END OF REPORT **                           



MRI Knee Right Wo ContMRI Knee Right Wo Cont

## 2023-02-22 NOTE — P.OP
Preoperative diagnosis: right knee osteoarthritis


Postoperative diagnosis: same


Primary procedure: right total knee arthroplasty


Anesthesia: general


Estimated blood loss: 40 cc


Specimen: right knee bone remnants


Findings: see dictation


Operative Technique: 





Indication For Procedure:  Carissa is a 72 year-old female presenting to my clinic

with signs, symptoms and x-ray findings consistent with severe right knee 

osteoarthritis.  I discussed with the patient at length risks and benefits 

associated with operative and nonoperative treatment.  She had failed 

conservative treatment measures and had significant difficulties with ADLs 

secondary to her pain.  We discussed operative treatment and elected to proceed 

with right total knee arthroplasty.  She expressed understanding and elected to 

proceed with operative treatment.





Description Of Procedure:  After informed consent was obtained, the patient was 

identified in the preoperative holding area.  The right lower extremity was 

marked.  The patient was then taken to the PACU where she underwent a right 

lower extremity adductor canal block performed by Anesthesia.  She was then 

taken to the operating room, transferred to the operating table in supine 

fashion, and placed under general anesthesia.  The right lower extremity was 

then prepped and draped in usual sterile fashion.  A time-out was initiated.  

The correct patient and procedure were confirmed and identified.  The patient 

did receive her preoperative prophylactic antibiotics.  The right lower 

extremity was then exsanguinated and tourniquet was inflated to 300 mmHg.  

Approximately 15 cm longitudinal incision was made centered over the anterior 

aspect of the right knee.  Dissection was then taken to the extensor mechanism 

and a medial parapatellar arthrotomy was performed.  The patella was everted and

dislocated laterally and the knee was flexed in the fat pad.  Medial and lateral

meniscus and ACL were all excised exposing the distal femur.  Excess 

hypertrophic synovium was also excised within the suprapatellar pouch.  The 

patient had an MRI of her right knee preoperatively for surgical planning and 

creation of cutting blocks.  The cutting block was then placed over the distal 

femur and pins were then placed.  The distal femoral cutting block was then 

placed over the pins.  An spencer wing was then used to ensure proper depth cut 

and the distal femur was then cut.  The chamfer cutting guide was then placed 

over the distal end of the femur.  Anterior, posterior cuts as well as anterior 

and posterior chamfer cuts were then made again confirming proper depth of the 

cut using an Spencer wing.  Excess bone remnants were then sent to pathology for 

further evaluation.  Next, attention was taken to the proximal tibia.  A tibial 

jig and tibial cutting block was then placed on proximal aspect of the right 

tibia and locked into position.  Pins were then placed and alignment guide was 

then used to confirm proper alignment of the cut and then coronal and sagittal 

planes.  Once this was confirmed, the cutting jig was placed over the pins and 

the proximal tibia was cut.    Sizing trays were then selected and size 10 mm 

spacer was used and there was good overall balance in flexion and extension.  

Next, the trial implants were then placed using the size 5 standard CR femur and

a size D tibia and an 10 mm CR poly.  There was overall good range of motion and

good stability.  The trial implants were then removed.  This improved the 

overall stability of the knee and components.  The wound was then irrigated 

thoroughly with normal saline and the knee was then injected with 30 cc of 0.5% 

Marcaine both in the posterior capsule and mediallateral gutters as well as 

quadriceps tendon and periosteum.  The tibia was then punched.  The femur was 

drilled.  The cement was then prepared on the back table.  Cement was then 

placed first on the tibial surface followed by size D tibia.  Excess cement was 

removed with [place] elevators.  Size 5 standard CR femur was then placed on the 

distal femur after cement was placed on the distal femur.  Excess cement was 

then removed and a size 10 mm CR trial poly was then placed.  The knee was held 

in extension as the cement hardened.  Undersurface of the patella was prepared 

debriding osteophytes using rongeurs as well as osteophytes..  Cement was placed

on the undersurface of the patella after it was cut and a size 26 patella was 

placed.  Once the cement was hardened, the knee was ranged, there was good 

overall stability both in flexion, extension and as well as stability with varus

and valgus stresses.  Trial poly was then removed and a size 10 mm CR poly was 

then placed and locked into position.  The knee was then ranged again.  There 

was good overall range of motion both for flexion and extension with good 

stability. The wound was then irrigated again thoroughly with normal saline 

using pulse lavage.  Tourniquet was let down.  Hemostasis was achieved using 

Bovie electrocautery.  Extensor mechanism was then approximated using a #1 

Vicryl both in interrupted and running fashion.  The fascia was then 

approximated using 0 Vicryl.  Subcutaneous tissue was approximated with a 2-0 

Vicryl.  Skin was approximated using staples.  Sterile dressings were applied.  

The patient was awakened and transferred back in stable condition


Complications: None


Implants: Biomet Jules Persona 5 CR femur, D tibia, 26 patella, 10 CR poly


Fluids & blood products: per anesthesia record; TT: 64 mins @ 300 mmHG


Transferred to: Recovery Room


Condition: Good

## 2023-02-23 VITALS — TEMPERATURE: 97 F | SYSTOLIC BLOOD PRESSURE: 104 MMHG | DIASTOLIC BLOOD PRESSURE: 66 MMHG

## 2023-02-23 LAB — HCT VFR BLD CALC: 33.1 % (ref 36–45)

## 2023-02-23 RX ADMIN — SODIUM CHLORIDE SCH MLS: 9 INJECTION, SOLUTION INTRAVENOUS at 00:28

## 2023-02-23 RX ADMIN — SODIUM CHLORIDE SCH MLS: 9 INJECTION, SOLUTION INTRAVENOUS at 08:55

## 2023-02-23 RX ADMIN — ONDANSETRON PRN MG: 2 INJECTION INTRAMUSCULAR; INTRAVENOUS at 12:23

## 2023-02-23 RX ADMIN — HYDROCODONE BITARTRATE AND ACETAMINOPHEN PRN TAB: 7.5; 325 TABLET ORAL at 12:23

## 2023-02-23 RX ADMIN — ENOXAPARIN SODIUM SCH: 30 INJECTION SUBCUTANEOUS at 08:55

## 2023-02-23 RX ADMIN — HYDROCODONE BITARTRATE AND ACETAMINOPHEN PRN TAB: 7.5; 325 TABLET ORAL at 05:42

## 2023-02-23 RX ADMIN — ENOXAPARIN SODIUM SCH MG: 30 INJECTION SUBCUTANEOUS at 05:42

## 2023-02-23 NOTE — P.DS
Admission Date: 02/22/23


Discharge Date: 02/23/23


Disposition: DC HOME/HOME HEALTH CARE


Discharge Condition: GOOD


Reason for Admission: s/p right total knee arthroplasty


Consultations: 





Hospitalist Medicine: Dr. Jasmine


Procedures: 





R TKA 2/22/23


Brief History of Present Illness: 





Carissa is a 73 yo female s/p R TKA on 2/22/23 admitted to the floor 

postoperatively in stable condition.


Hospital Course: 





Carissa did well after surgery and vital signs remained stable.  Dr. Jasmine was 

consulted to aid with medical management.  She received lovenox for DVT 

prophylaxis while in hospital and discharged with Xarelto and Norco.  She 

ambulated safefly with PT and was discharged on 2/23/23 in stable condition.


Vital Signs/Physical Exam: 














Temp Pulse Resp BP Pulse Ox


 


 97 F   68   18   104/66   95 


 


 02/23/23 12:00  02/23/23 12:00  02/23/23 12:23  02/23/23 12:00  02/23/23 12:23








Laboratory Data at Discharge: 














WBC  8.00 K/uL (4.3-10.9)   02/17/23  10:18    


 


Hgb  11.2 g/dL (12.0-15.0)  L  02/23/23  04:35    


 


Hct  33.1 % (36.0-45.0)  L  02/23/23  04:35    


 


Plt Count  267 K/uL (152-406)   02/17/23  10:18    


 


PT  11.0 SECONDS (9.5-12.5)   02/17/23  10:18    


 


INR  1.00   02/17/23  10:18    


 


APTT  28.5 SECONDS (24.3-36.9)   02/17/23  10:18    


 


Sodium  138 mmol/L (136-145)   02/17/23  10:18    


 


Potassium  4.2 mmol/L (3.5-5.1)   02/17/23  10:18    


 


BUN  13 mg/dL (7-18)   02/17/23  10:18    


 


Creatinine  0.88 mg/dL (0.55-1.02)   02/17/23  10:18    


 


Glucose  100 mg/dL ()   02/17/23  10:18    








Home Medications: 








Amlodipine [Norvasc*] 5 mg PO DAILY 02/17/23 


Loratadine [Claritin*] 10 mg PO DAILY PRN 02/17/23 


Multivitamin 1 each PO DAILY 02/17/23 


Omeprazole 10 mg PO DAILY PRN 02/17/23 


Hydrocodone 7.5/APAP 325 [Norco 7.5/325 mg*] 1 tab PO Q4H PRN  tab 02/23/23 





Physician Discharge Instructions: 


Keep dressing clean, dry and intact.  Begin Xarelto tomorrow morning February 24

in the morning with breakfast and take once a day.  Follow-up with Dr. Alonzo in 2

weeks for staple removal.  Continue use of SAMY hose for 2 weeks to aid with 

swelling.


Diet: Regular


Activity: Weight bearing as tolerated


Followup: 


Miguel A Alonzo MD [ACTIVE - CAN ADMIT] - 1-2 Weeks

## 2023-02-23 NOTE — P.CNS
Date of Consult: 02/23/23


Reason for Consult: medical management


Requesting Physician: Miguel A Alonzo


Chief Complaint: s/p right total knee arthroplasty


History of Present Illness: 





72-year-old female who presents to the hospital for right total knee arthroplas

ty.  Patient has a history of osteoarthritis as well as hypertension.  Patient 

was having a lot of pain in that right knee so she decided to have it replaced. 

Patient has tolerated the procedure well.  She is using a continuous passive 

range of motion at this point and her pain is manageable.  She will work with 

physical therapy in the morning.  If she does well anticipate discharge.  

Patient has a longstanding history of multiple back surgeries as well as skin 

cancer that has been removed from the nose.  Otherwise, medically she has been 

doing well.  We will monitor her during the perioperative period and if she is 

doing well tomorrow with physical therapy anticipate discharge home with pain 

medication and anticoagulation.


Allergies





lisinopril Allergy (Verified 02/17/23 09:44)


   Anaphylaxis


tramadol Allergy (Verified 02/17/23 09:44)


   Hives


codeine Adverse Reaction (Verified 02/17/23 09:44)


   stomach pain


morphine Adverse Reaction (Verified 02/17/23 09:44)


   decreased hr





Home Medications: 








Amlodipine [Norvasc*] 5 mg PO DAILY 02/17/23 


Loratadine [Claritin*] 10 mg PO DAILY PRN 02/17/23 


Multivitamin 1 each PO DAILY 02/17/23 


Omeprazole 10 mg PO DAILY PRN 02/17/23 


Hydrocodone 7.5/APAP 325 [Norco 7.5/325 mg*] 1 tab PO Q4H PRN  tab 02/23/23 








- Past Medical/Surgical History


Diabetic: No


-: HTN


-: arthritis


-: sinus headaches


-: 2 c sections


-: hysterectomy


-: gall bladder removed


-: back infusion 2 rods,4 screws, and cage


-: neck surgery, plate in anterior neck


-: cancer nose


-: rt TKR 2/23





- Family History


  ** Mother


Medical History: Heart disease





- Social History


Alcohol use: No


CD- Drugs: No


Caffeine use: Yes


Place of Residence: Home





Review of Systems


10-point ROS is otherwise unremarkable





Physical Examination














Temp Pulse Resp BP Pulse Ox


 


 97.3 F   62   18   105/53 L  93 


 


 02/23/23 08:00  02/23/23 08:00  02/23/23 08:00  02/23/23 08:00  02/23/23 08:00








General: Alert, In no apparent distress


HEENT: Atraumatic, PERRLA, Mucous membr. moist/pink, EOMI, Sclerae nonicteric


Neck: Supple, 2+ carotid pulse no bruit, No LAD, Without JVD or thyroid 

abnormality


Respiratory: Clear to auscultation bilaterally, Normal air movement


Cardiovascular: Regular rate/rhythm, Normal S1 S2


Gastrointestinal: Normal bowel sounds, No tenderness


Musculoskeletal: Tenderness, Other (pt has her right knee and continues passive 

range of motion device)


Integumentary: No rashes


Neurological: Normal gait, Normal speech, Normal tone, Normal affect


Lymphatics: No axilla or inguinal lymphadenopathy


Laboratory Data (last 24 hrs)





02/23/23 04:35: Hgb 11.2 L, Hct 33.1 L


02/22/23 15:55: Hgb 11.6 L, Hct 34.3 L








- Problems


(1) Status post total right knee replacement


Current Visit: Yes   Status: Acute   





(2) HTN (hypertension)


Current Visit: Yes   Status: Acute   





(3) Osteoarthritis


Current Visit: Yes   Status: Acute   


Conclusions/ Impression: 





Plan:


1.  Continued management per orthopedics


2.  Physical therapy evaluation


3.  Continue antihypertensives


4.  Pain control


5.  GI DVT prophylaxis


Critical Care: No


Time Spent Managing Pts care (In Minutes): 35

## 2025-05-07 LAB
APTT BLD: 29.4 SECONDS (ref 27.2–37.4)
HCT VFR BLD CALC: 39.7 % (ref 36–45)
HGB BLD-MCNC: 13.4 G/DL (ref 12–15)
MCH RBC QN AUTO: 30.3 PG (ref 27–35)
MCHC RBC AUTO-ENTMCNC: 33.9 G/DL (ref 32–36)
MCV RBC: 89.5 FL (ref 80–100)
PMV BLD: 9.5 FL (ref 7.6–11.3)
PROTHROMBIN TIME: 11.4 SECONDS (ref 10–13)
RBC # BLD: 4.43 M/UL (ref 3.86–4.86)

## 2025-05-09 ENCOUNTER — HOSPITAL ENCOUNTER (OUTPATIENT)
Dept: HOSPITAL 97 - OR | Age: 75
Discharge: HOME | End: 2025-05-09
Attending: ORTHOPAEDIC SURGERY
Payer: COMMERCIAL

## 2025-05-09 VITALS — DIASTOLIC BLOOD PRESSURE: 56 MMHG | SYSTOLIC BLOOD PRESSURE: 152 MMHG

## 2025-05-09 VITALS — OXYGEN SATURATION: 96 % | TEMPERATURE: 97.2 F

## 2025-05-09 DIAGNOSIS — G56.01: Primary | ICD-10-CM

## 2025-05-09 PROCEDURE — 36415 COLL VENOUS BLD VENIPUNCTURE: CPT

## 2025-05-09 PROCEDURE — 85610 PROTHROMBIN TIME: CPT

## 2025-05-09 PROCEDURE — 80048 BASIC METABOLIC PNL TOTAL CA: CPT

## 2025-05-09 PROCEDURE — 85025 COMPLETE CBC W/AUTO DIFF WBC: CPT

## 2025-05-09 PROCEDURE — 71046 X-RAY EXAM CHEST 2 VIEWS: CPT

## 2025-05-09 PROCEDURE — 01N50ZZ RELEASE MEDIAN NERVE, OPEN APPROACH: ICD-10-PCS

## 2025-05-09 PROCEDURE — 85730 THROMBOPLASTIN TIME PARTIAL: CPT

## 2025-05-09 PROCEDURE — 93005 ELECTROCARDIOGRAM TRACING: CPT

## 2025-05-09 PROCEDURE — 64721 CARPAL TUNNEL SURGERY: CPT

## 2025-05-09 RX ADMIN — HYDROCODONE BITARTRATE AND ACETAMINOPHEN ONE TAB: 7.5; 325 TABLET ORAL at 09:44

## 2025-05-09 RX ADMIN — BUPIVACAINE HYDROCHLORIDE ONE ML: 2.5 INJECTION, SOLUTION EPIDURAL; INFILTRATION; INTRACAUDAL at 08:28

## 2025-05-09 RX ADMIN — SODIUM CHLORIDE ONE GM: 9 INJECTION, SOLUTION INTRAVENOUS at 07:59

## 2025-05-09 RX ADMIN — SODIUM CHLORIDE, SODIUM LACTATE, POTASSIUM CHLORIDE, AND CALCIUM CHLORIDE ONE MLS: .6; .31; .03; .02 INJECTION, SOLUTION INTRAVENOUS at 07:15
